# Patient Record
Sex: FEMALE | Race: ASIAN | NOT HISPANIC OR LATINO | Employment: FULL TIME | URBAN - METROPOLITAN AREA
[De-identification: names, ages, dates, MRNs, and addresses within clinical notes are randomized per-mention and may not be internally consistent; named-entity substitution may affect disease eponyms.]

---

## 2017-01-30 ENCOUNTER — OFFICE VISIT (OUTPATIENT)
Dept: PSYCHOLOGY | Facility: CLINIC | Age: 32
End: 2017-01-30
Payer: COMMERCIAL

## 2017-01-30 DIAGNOSIS — F43.29 ADJUSTMENT DISORDER WITH DISTURBANCE OF EMOTION: Primary | ICD-10-CM

## 2017-01-30 PROCEDURE — 90834 PSYTX W PT 45 MINUTES: CPT | Performed by: PSYCHOLOGIST

## 2017-01-30 NOTE — PROGRESS NOTES
Progress Note    Client Name: Berna Cheung  Date: 2017         Service Type: Consult Note      Session Start Time: 12:00  Session End Time: 12:45      Session Length: 45     Session #: 9     Attendees: Client attended alone    Treatment Plan Last Reviewed: 2017     DATA      Progress Since Last Session (Related to Symptoms / Goals / Homework):   Symptoms: Luisito took a hiatus from therapy because her mother .  She has been dealing with the  and estate.  She is experiencing intense grief and would like help working through it.    Homework: none      Episode of Care Goals: Adjust to mother's Alzheimer's disease.  Restart her life after being the sole care giver to her mother.  Resolve grief and loss issues.      Current / Ongoing Stressors and Concerns:   Mother has Alzheimer's,  Cannot be left alone, caring for her own child as well.     Treatment Objective(s) Addressed in This Session:   Develop and implement a plan for the care of her mother as well as restarting her own independent life.       Intervention:   Motivational interviewing, facilitate appropriate grieving, assist with planning.        ASSESSMENT: Current Emotional / Mental Status (status of significant symptoms):   Risk status (Self / Other harm or suicidal ideation)   Client denies current fears or concerns for personal safety.   Client denies current or recent suicidal ideation or behaviors.   Client denies current or recent homicidal ideation or behaviors.   Client denies current or recent self injurious behavior or ideation.   Client denies other safety concerns.   A safety and risk management plan has not been developed at this time, however client was given the after-hours number / 911 should there be a change in any of these risk factors.     Appearance:   Appropriate    Eye Contact:   Good    Psychomotor Behavior: Normal    Attitude:   Cooperative     Orientation:   All   Speech    Rate / Production: Normal     Volume:  Normal    Mood:    Depressed  Normal   Affect:    Appropriate    Thought Content:  Clear    Thought Form:  Coherent  Logical    Insight:    Good      Medication Review:   No changes to current psychiatric medication(s)     Medication Compliance:   Yes     Changes in Health Issues:   None reported     Chemical Use Review:   Substance Use: Chemical use reviewed, no active concerns identified      Tobacco Use: No change in amount of tobacco use since last session.  Patient declined discussion at this time     Collateral Reports Completed:   Not Applicable    PLAN: (Client Tasks / Therapist Tasks / Other)  Work on processing grief and loss.        Bert Pablo LP                                                         ________________________________________________________________________    Treatment Plan    Client's Name: Berna Cheung  YOB: 1985    Date: 6/16/2016    DSM-V Diagnoses: Adjustment DO with Anxiety and Depression  Psychosocial / Contextual Factors: Caring for mother with Alzheimer's  WHODAS: 24    Referral / Collaboration:  Referral to another professional/service is not indicated at this time..    Anticipated number of session or this episode of care: 20      MeasurableTreatment Goal(s) related to diagnosis / functional impairment(s)  Goal 1:  Reduce client's self report of symptoms from an 8 to a 4 on a 1 to 10 scale.       Objective #A (Client Action)    Work on coping strategies and making the adjustment to life without her mother    Intervention(s)  Motivational interviewing, facilitate appropriate grieving, assist with planning.      Client and Parent / Guardian has reviewed and agreed to the above plan.      Bert Pablo LP  June 14, 2016

## 2017-02-02 ASSESSMENT — ANXIETY QUESTIONNAIRES
5. BEING SO RESTLESS THAT IT IS HARD TO SIT STILL: MORE THAN HALF THE DAYS
3. WORRYING TOO MUCH ABOUT DIFFERENT THINGS: NEARLY EVERY DAY
7. FEELING AFRAID AS IF SOMETHING AWFUL MIGHT HAPPEN: NOT AT ALL
1. FEELING NERVOUS, ANXIOUS, OR ON EDGE: NEARLY EVERY DAY
GAD7 TOTAL SCORE: 17
6. BECOMING EASILY ANNOYED OR IRRITABLE: NEARLY EVERY DAY
2. NOT BEING ABLE TO STOP OR CONTROL WORRYING: NEARLY EVERY DAY

## 2017-02-02 ASSESSMENT — PATIENT HEALTH QUESTIONNAIRE - PHQ9: 5. POOR APPETITE OR OVEREATING: NEARLY EVERY DAY

## 2017-02-03 ASSESSMENT — ANXIETY QUESTIONNAIRES: GAD7 TOTAL SCORE: 17

## 2017-02-03 ASSESSMENT — PATIENT HEALTH QUESTIONNAIRE - PHQ9: SUM OF ALL RESPONSES TO PHQ QUESTIONS 1-9: 13

## 2017-02-27 ENCOUNTER — OFFICE VISIT (OUTPATIENT)
Dept: PSYCHOLOGY | Facility: CLINIC | Age: 32
End: 2017-02-27
Payer: MEDICAID

## 2017-02-27 DIAGNOSIS — F43.29: Primary | ICD-10-CM

## 2017-02-27 PROCEDURE — 90834 PSYTX W PT 45 MINUTES: CPT | Performed by: PSYCHOLOGIST

## 2017-02-27 ASSESSMENT — ANXIETY QUESTIONNAIRES
6. BECOMING EASILY ANNOYED OR IRRITABLE: SEVERAL DAYS
7. FEELING AFRAID AS IF SOMETHING AWFUL MIGHT HAPPEN: NOT AT ALL
2. NOT BEING ABLE TO STOP OR CONTROL WORRYING: SEVERAL DAYS
5. BEING SO RESTLESS THAT IT IS HARD TO SIT STILL: SEVERAL DAYS
3. WORRYING TOO MUCH ABOUT DIFFERENT THINGS: SEVERAL DAYS
1. FEELING NERVOUS, ANXIOUS, OR ON EDGE: SEVERAL DAYS
GAD7 TOTAL SCORE: 6

## 2017-02-27 ASSESSMENT — PATIENT HEALTH QUESTIONNAIRE - PHQ9: 5. POOR APPETITE OR OVEREATING: SEVERAL DAYS

## 2017-02-27 NOTE — MR AVS SNAPSHOT
"                  MRN:[id number]                      After Visit Summary   2017    Berna Cheung    MRN: [id number]           Visit Information        Provider Department      2017 1:00 PM Bert Pablo, FARHAD [facility]      Your next 10 appointments already scheduled     Mar 13, 2017 12:00 PM CDT   Return Visit with Bert Pablo LP   [facility] ([facility])    [address]  [facility] [address]   [phone]            Mar 27, 2017  1:00 PM CDT   Return Visit with Bert Pablo LP   [facility] ([facility])    [address]  [facility] [address] [phone]              MyChart Information     [employer] lets you send messages to your doctor, view your test results, renew your prescriptions, schedule appointments and more. To sign up, go to [email or web address] . Click on \"Log in\" on the left side of the screen, which will take you to the Welcome page. Then click on \"Sign up Now\" on the right side of the page.     You will be asked to enter the access code listed below, as well as some personal information. Please follow the directions to create your username and password.     Your access code is: [email or web address]  Expires: 2017  2:02 PM     Your access code will  in 90 days. If you need help or a new code, please call your [facility] clinic or [phone].        Care EveryWhere ID     This is your Care EveryWhere ID. This could be used by other organizations to access your [facility] medical records  [id number]        "

## 2017-02-27 NOTE — PROGRESS NOTES
Progress Note    Client Name: Berna Cheung  Date: 2/27/2017         Service Type: Consult Note      Session Start Time: 1:00  Session End Time: 1:45      Session Length: 45     Session #: 10     Attendees: Client attended alone    Treatment Plan Last Reviewed: 1/30/2017, 2/27/2017     DATA      Progress Since Last Session (Related to Symptoms / Goals / Homework):   Symptoms: Francisca looks good.  She is continuing to work through her grief but she is fully involved in planning her future.  There is a lot of work to do to sort out her mother's estate especially decisions about what to do with her harry lestate.  Homework: none      Episode of Care Goals: Adjust to mother's Alzheimer's disease.  Restart her life after being the sole care giver to her mother.  Resolve grief and loss issues.      Current / Ongoing Stressors and Concerns:   Mother has Alzheimer's,  Cannot be left alone, caring for her own child as well.     Treatment Objective(s) Addressed in This Session:   Develop and implement a plan for the care of her mother as well as restarting her own independent life.       Intervention:   Motivational interviewing, facilitate appropriate grieving, assist with planning.        ASSESSMENT: Current Emotional / Mental Status (status of significant symptoms):   Risk status (Self / Other harm or suicidal ideation)   Client denies current fears or concerns for personal safety.   Client denies current or recent suicidal ideation or behaviors.   Client denies current or recent homicidal ideation or behaviors.   Client denies current or recent self injurious behavior or ideation.   Client denies other safety concerns.   A safety and risk management plan has not been developed at this time, however client was given the after-hours number / 911 should there be a change in any of these risk factors.     Appearance:   Appropriate    Eye Contact:   Good    Psychomotor Behavior: Normal     Attitude:   Cooperative    Orientation:   All   Speech    Rate / Production: Normal     Volume:  Normal    Mood:    Depressed  Normal   Affect:    Appropriate    Thought Content:  Clear    Thought Form:  Coherent  Logical    Insight:    Good      Medication Review:   No changes to current psychiatric medication(s)     Medication Compliance:   Yes     Changes in Health Issues:   None reported     Chemical Use Review:   Substance Use: Chemical use reviewed, no active concerns identified      Tobacco Use: No change in amount of tobacco use since last session.  Patient declined discussion at this time     Collateral Reports Completed:   Not Applicable    PLAN: (Client Tasks / Therapist Tasks / Other)  Work on processing grief and loss.        Bert Pablo LP                                                         ________________________________________________________________________    Treatment Plan    Client's Name: Berna Cheung  YOB: 1985    Date: 6/16/2016    DSM-V Diagnoses: Adjustment DO with Anxiety and Depression  Psychosocial / Contextual Factors: Caring for mother with Alzheimer's  WHODAS: 24    Referral / Collaboration:  Referral to another professional/service is not indicated at this time..    Anticipated number of session or this episode of care: 20      MeasurableTreatment Goal(s) related to diagnosis / functional impairment(s)  Goal 1:  Reduce client's self report of symptoms from an 8 to a 4 on a 1 to 10 scale.       Objective #A (Client Action)    Work on coping strategies and making the adjustment to life without her mother    Intervention(s)  Motivational interviewing, facilitate appropriate grieving, assist with planning.      Client and Parent / Guardian has reviewed and agreed to the above plan.      Bert Pablo LP  June 14, 2016

## 2017-02-28 ASSESSMENT — PATIENT HEALTH QUESTIONNAIRE - PHQ9: SUM OF ALL RESPONSES TO PHQ QUESTIONS 1-9: 8

## 2017-02-28 ASSESSMENT — ANXIETY QUESTIONNAIRES: GAD7 TOTAL SCORE: 6

## 2017-04-10 ENCOUNTER — OFFICE VISIT (OUTPATIENT)
Dept: PSYCHOLOGY | Facility: CLINIC | Age: 32
End: 2017-04-10
Payer: MEDICAID

## 2017-04-10 DIAGNOSIS — F43.29 ADJUSTMENT REACTION WITH PREDOMINANT DISTURBANCE OF EMOTIONS: Primary | ICD-10-CM

## 2017-04-10 PROCEDURE — 90834 PSYTX W PT 45 MINUTES: CPT | Performed by: PSYCHOLOGIST

## 2017-04-10 ASSESSMENT — ANXIETY QUESTIONNAIRES
1. FEELING NERVOUS, ANXIOUS, OR ON EDGE: NEARLY EVERY DAY
7. FEELING AFRAID AS IF SOMETHING AWFUL MIGHT HAPPEN: SEVERAL DAYS
GAD7 TOTAL SCORE: 13
5. BEING SO RESTLESS THAT IT IS HARD TO SIT STILL: SEVERAL DAYS
2. NOT BEING ABLE TO STOP OR CONTROL WORRYING: SEVERAL DAYS
3. WORRYING TOO MUCH ABOUT DIFFERENT THINGS: NEARLY EVERY DAY
6. BECOMING EASILY ANNOYED OR IRRITABLE: NEARLY EVERY DAY

## 2017-04-10 ASSESSMENT — PATIENT HEALTH QUESTIONNAIRE - PHQ9: 5. POOR APPETITE OR OVEREATING: SEVERAL DAYS

## 2017-04-10 NOTE — MR AVS SNAPSHOT
"                  MRN:[id number]                      After Visit Summary   4/10/2017    Berna Cheung    MRN: [id number]           Visit Information        Provider Department      4/10/2017 12:00 PM Bert Pablo, FARHAD [facility]      Your next 10 appointments already scheduled     2017  1:00 PM CDT   Return Visit with Bert Pablo LP   [facility] [place] ([facility] [place])    [address]  [place] [address]   [phone]            2017  1:00 PM CDT   Return Visit with Bert Pablo LP   [facility] [place] ([facility] [place])    [address]  [place] [address]   [phone]            May 01, 2017  1:00 PM CDT   Return Visit with Bert Pablo LP   [facility] [place] ([facility] [place])    [address]  [place] [address]   [phone]              MyChart Information     [employer] lets you send messages to your doctor, view your test results, renew your prescriptions, schedule appointments and more. To sign up, go to [email or web address] . Click on \"Log in\" on the left side of the screen, which will take you to the Welcome page. Then click on \"Sign up Now\" on the right side of the page.     You will be asked to enter the access code listed below, as well as some personal information. Please follow the directions to create your username and password.     Your access code is: [email or web address]  Expires: 2017  3:02 PM     Your access code will  in 90 days. If you need help or a new code, please call your [facility] clinic or [phone].        Care EveryWhere ID     This is your Care EveryWhere ID. This could be used by other organizations to access your [facility] medical records  [id number]        "

## 2017-04-10 NOTE — PROGRESS NOTES
Progress Note    Client Name: Berna Cheung  Date: 4/10/2017         Service Type: Consult Note      Session Start Time: 12:00  Session End Time: 12:45      Session Length: 45     Session #: 11     Attendees: Client attended alone    Treatment Plan Last Reviewed: 1/30/2017, 2/27/2017, 4/10/2017     DATA      Progress Since Last Session (Related to Symptoms / Goals / Homework):   Symptoms: Francisca is having a difficult struggle with the guilt stage of grief and loss.  She is struggling at school because a few of her fellow students are very negative and gossipy. She feels isolated because she has pulled away from the party culture most of her friends belong to.      Episode of Care Goals: Adjust to mother's Alzheimer's disease.  Restart her life after being the sole care giver to her mother.  Resolve grief and loss issues.      Current / Ongoing Stressors and Concerns:   Mother has Alzheimer's,  Cannot be left alone, caring for her own child as well.     Treatment Objective(s) Addressed in This Session:   Develop and implement a plan for the care of her mother as well as restarting her own independent life.       Intervention:   Motivational interviewing, facilitate appropriate grieving, assist with planning.        ASSESSMENT: Current Emotional / Mental Status (status of significant symptoms):   Risk status (Self / Other harm or suicidal ideation)   Client denies current fears or concerns for personal safety.   Client denies current or recent suicidal ideation or behaviors.   Client denies current or recent homicidal ideation or behaviors.   Client denies current or recent self injurious behavior or ideation.   Client denies other safety concerns.   A safety and risk management plan has not been developed at this time, however client was given the after-hours number / 911 should there be a change in any of these risk factors.     Appearance:   Appropriate    Eye  Contact:   Good    Psychomotor Behavior: Normal    Attitude:   Cooperative    Orientation:   All   Speech    Rate / Production: Normal     Volume:  Normal    Mood:    Depressed  Normal   Affect:    Appropriate    Thought Content:  Clear    Thought Form:  Coherent  Logical    Insight:    Good      Medication Review:   No changes to current psychiatric medication(s)     Medication Compliance:   Yes     Changes in Health Issues:   None reported     Chemical Use Review:   Substance Use: Chemical use reviewed, no active concerns identified      Tobacco Use: No change in amount of tobacco use since last session.  Patient declined discussion at this time     Collateral Reports Completed:   Not Applicable    PLAN: (Client Tasks / Therapist Tasks / Other)  Work on processing grief and loss. Francisca would like to meet weekly for a while.        Bert Pablo LP                                                         ________________________________________________________________________    Treatment Plan    Client's Name: Berna Cheung  YOB: 1985    Date: 6/16/2016    DSM-V Diagnoses: Adjustment DO with Anxiety and Depression  Psychosocial / Contextual Factors: Caring for mother with Alzheimer's  WHODAS: 24    Referral / Collaboration:  Referral to another professional/service is not indicated at this time..    Anticipated number of session or this episode of care: 20      MeasurableTreatment Goal(s) related to diagnosis / functional impairment(s)  Goal 1:  Reduce client's self report of symptoms from an 8 to a 4 on a 1 to 10 scale.       Objective #A (Client Action)    Work on coping strategies and making the adjustment to life without her mother    Intervention(s)  Motivational interviewing, facilitate appropriate grieving, assist with planning.      Client and Parent / Guardian has reviewed and agreed to the above plan.      Bert Pablo, FARHAD  June 14, 2016

## 2017-04-11 ASSESSMENT — ANXIETY QUESTIONNAIRES: GAD7 TOTAL SCORE: 13

## 2017-04-11 ASSESSMENT — PATIENT HEALTH QUESTIONNAIRE - PHQ9: SUM OF ALL RESPONSES TO PHQ QUESTIONS 1-9: 15

## 2017-04-17 ENCOUNTER — OFFICE VISIT (OUTPATIENT)
Dept: URGENT CARE | Facility: URGENT CARE | Age: 32
End: 2017-04-17
Payer: MEDICAID

## 2017-04-17 ENCOUNTER — OFFICE VISIT (OUTPATIENT)
Dept: PSYCHOLOGY | Facility: CLINIC | Age: 32
End: 2017-04-17
Payer: MEDICAID

## 2017-04-17 VITALS
HEART RATE: 89 BPM | SYSTOLIC BLOOD PRESSURE: 132 MMHG | WEIGHT: 198 LBS | TEMPERATURE: 99.1 F | OXYGEN SATURATION: 96 % | DIASTOLIC BLOOD PRESSURE: 78 MMHG | BODY MASS INDEX: 33.99 KG/M2

## 2017-04-17 DIAGNOSIS — R09.89 CHEST CONGESTION: ICD-10-CM

## 2017-04-17 DIAGNOSIS — R05.8 PRODUCTIVE COUGH: Primary | ICD-10-CM

## 2017-04-17 DIAGNOSIS — F43.29: Primary | ICD-10-CM

## 2017-04-17 PROCEDURE — 90834 PSYTX W PT 45 MINUTES: CPT | Performed by: PSYCHOLOGIST

## 2017-04-17 PROCEDURE — 99214 OFFICE O/P EST MOD 30 MIN: CPT | Performed by: PHYSICIAN ASSISTANT

## 2017-04-17 RX ORDER — AZITHROMYCIN 250 MG/1
TABLET, FILM COATED ORAL
Qty: 6 TABLET | Refills: 0 | Status: SHIPPED | OUTPATIENT
Start: 2017-04-17 | End: 2017-09-11

## 2017-04-17 RX ORDER — LORATADINE 10 MG/1
10 TABLET ORAL DAILY
COMMUNITY
End: 2018-01-31

## 2017-04-17 NOTE — PROGRESS NOTES
Progress Note    Client Name: Berna Cheung  Date: 4/17/2017         Service Type: Consult Note      Session Start Time: 1:00  Session End Time: 1:45      Session Length: 45     Session #: 12     Attendees: Client attended alone    Treatment Plan Last Reviewed: 1/30/2017, 2/27/2017, 4/10/2017, 4/17/2017     DATA      Progress Since Last Session (Related to Symptoms / Goals / Homework):   Symptoms: Francisca is doing fine.  She is hanging in there at school and has a good attitude about it.  She working working with some different girls who are young but not as mean.   She has been sick with a cold for the past week.  She is going to go to the clinic to get checked.      Episode of Care Goals: Adjust to mother's Alzheimer's disease.  Restart her life after being the sole care giver to her mother.  Resolve grief and loss issues.      Current / Ongoing Stressors and Concerns:   Mother has Alzheimer's,  Cannot be left alone, caring for her own child as well.     Treatment Objective(s) Addressed in This Session:   Develop and implement a plan for the care of her mother as well as restarting her own independent life.       Intervention:   Motivational interviewing, facilitate appropriate grieving, assist with planning.        ASSESSMENT: Current Emotional / Mental Status (status of significant symptoms):   Risk status (Self / Other harm or suicidal ideation)   Client denies current fears or concerns for personal safety.   Client denies current or recent suicidal ideation or behaviors.   Client denies current or recent homicidal ideation or behaviors.   Client denies current or recent self injurious behavior or ideation.   Client denies other safety concerns.   A safety and risk management plan has not been developed at this time, however client was given the after-hours number / 911 should there be a change in any of these risk factors.     Appearance:   Appropriate    Eye  Contact:   Good    Psychomotor Behavior: Normal    Attitude:   Cooperative    Orientation:   All   Speech    Rate / Production: Normal     Volume:  Normal    Mood:    Depressed  Normal   Affect:    Appropriate    Thought Content:  Clear    Thought Form:  Coherent  Logical    Insight:    Good      Medication Review:   No changes to current psychiatric medication(s)     Medication Compliance:   Yes     Changes in Health Issues:   None reported     Chemical Use Review:   Substance Use: Chemical use reviewed, no active concerns identified      Tobacco Use: No change in amount of tobacco use since last session.  Patient declined discussion at this time     Collateral Reports Completed:   Not Applicable    PLAN: (Client Tasks / Therapist Tasks / Other)  Work on processing grief and loss. Francisca would like to meet weekly for a while.        Bert Pablo LP                                                         ________________________________________________________________________    Treatment Plan    Client's Name: Berna Cheung  YOB: 1985    Date: 6/16/2016    DSM-V Diagnoses: Adjustment DO with Anxiety and Depression  Psychosocial / Contextual Factors: Caring for mother with Alzheimer's  WHODAS: 24    Referral / Collaboration:  Referral to another professional/service is not indicated at this time..    Anticipated number of session or this episode of care: 20      MeasurableTreatment Goal(s) related to diagnosis / functional impairment(s)  Goal 1:  Reduce client's self report of symptoms from an 8 to a 4 on a 1 to 10 scale.       Objective #A (Client Action)    Work on coping strategies and making the adjustment to life without her mother    Intervention(s)  Motivational interviewing, facilitate appropriate grieving, assist with planning.      Client and Parent / Guardian has reviewed and agreed to the above plan.      Bert Pablo, FARHAD  June 14, 2016

## 2017-04-17 NOTE — NURSING NOTE
"Chief Complaint   Patient presents with     Fever     Cough     Nasal Congestion     x 1 week        Initial /78 (BP Location: Left arm, Patient Position: Chair, Cuff Size: Adult Regular)  Pulse 89  Temp 99.1  F (37.3  C) (Oral)  Wt 198 lb (89.8 kg)  SpO2 96%  BMI 33.99 kg/m2 Estimated body mass index is 33.99 kg/(m^2) as calculated from the following:    Height as of 8/29/16: 5' 4\" (1.626 m).    Weight as of this encounter: 198 lb (89.8 kg).  Medication Reconciliation: complete  "

## 2017-04-17 NOTE — MR AVS SNAPSHOT
After Visit Summary   4/17/2017    Berna Cheung    MRN: [id number]           Patient Information     Date Of Birth          1985        Visit Information        Provider Department      4/17/2017 2:45 PM Rafael Radford PA-C [facility]        Today's Diagnoses     Productive cough    -  1    Chest congestion           Follow-ups after your visit        Your next 10 appointments already scheduled     Apr 24, 2017  1:00 PM CDT   Return Visit with Bert Pablo LP   [facility] [place] ([facility] [place])    [address] [place] [address] [address]  [place] [address]   [phone]            May 01, 2017  1:00 PM CDT   Return Visit with Bert Pablo LP   [facility] [place] ([facility] [place])    [address] [place] [address] [address]  [place] [address]   [phone]            May 08, 2017  1:00 PM CDT   Return Visit with Bert Pablo LP   [facility] [place] ([facility] [place])    [address] [place] [address] [address]  [place] [address]   [phone]              Who to contact     If you have questions or need follow up information about today's clinic visit or your schedule please contact [facility] directly at [phone].  Normal or non-critical lab and imaging results will be communicated to you by [employer], letter or phone within 4 business days after the clinic has received the results. If you do not hear from us within 7 days, please contact the clinic through MyChart or phone. If you have a critical or abnormal lab result, we will notify you by phone as soon as possible.  Submit refill requests through [employer] or call your pharmacy and they will forward the refill request to us. Please allow 3 business days for your refill to be completed.          Additional Information About Your Visit        [employer] Information     [employer] lets you send messages to your doctor, view your test results, renew your prescriptions, schedule  "appointments and more. To sign up, go to [email or web address] . Click on \"Log in\" on the left side of the screen, which will take you to the Welcome page. Then click on \"Sign up Now\" on the right side of the page.     You will be asked to enter the access code listed below, as well as some personal information. Please follow the directions to create your username and password.     Your access code is: [email or web address]  Expires: 2017  3:02 PM     Your access code will  in 90 days. If you need help or a new code, please call your [place] clinic or [phone].        Care EveryWhere ID     This is your Care EveryWhere ID. This could be used by other organizations to access your [place] medical records  [id number]        Your Vitals Were     Pulse Temperature Pulse Oximetry BMI (Body Mass Index)          89 99.1  F (37.3  C) (Oral) 96% 33.99 kg/m2         Blood Pressure from Last 3 Encounters:   17 132/78   16 112/70   16 114/70    Weight from Last 3 Encounters:   17 198 lb (89.8 kg)   16 196 lb 3.2 oz (89 kg)   16 205 lb 3.2 oz (93.1 kg)              Today, you had the following     No orders found for display         Today's Medication Changes          These changes are accurate as of: 17 11:59 PM.  If you have any questions, ask your nurse or doctor.               Start taking these medicines.        Dose/Directions    azithromycin 250 MG tablet   Commonly known as:  ZITHROMAX   Used for:  Productive cough, Chest congestion   Started by:  Rafael Radford PA-C        2 tabs po qd day 1, then 1 tab po qd days 2-5   Quantity:  6 tablet   Refills:  0            Where to get your medicines      Some of these will need a paper prescription and others can be bought over the counter.  Ask your nurse if you have questions.     Bring a paper prescription for each of these medications     azithromycin 250 MG tablet                Primary Care Provider Office Phone # Fax " #    Shakila Annalisa Sandoval [phone]       FATIMAH DONTRELL [facility] [address] [facility] [address]  [facility] [address]        Thank you!     Thank you for choosing [facility]  for your care. Our goal is always to provide you with excellent care. Hearing back from our patients is one way we can continue to improve our services. Please take a few minutes to complete the written survey that you may receive in the mail after your visit with us. Thank you!             Your Updated Medication List - Protect others around you: Learn how to safely use, store and throw away your medicines at www.disposemymeds.org.          This list is accurate as of: 4/17/17 11:59 PM.  Always use your most recent med list.                   Brand Name Dispense Instructions for use    albuterol 108 (90 BASE) MCG/ACT Inhaler    PROAIR HFA/PROVENTIL HFA/VENTOLIN HFA    1 Inhaler    Inhale 2 puffs into the lungs every 6 hours as needed for shortness of breath / dyspnea or wheezing       azithromycin 250 MG tablet    ZITHROMAX    6 tablet    2 tabs po qd day 1, then 1 tab po qd days 2-5       desonide 0.05 % ointment    DESOWEN    15 g    Apply sparingly once or twice per day as needed to affected area until the skin is better, then stop; repeat as needed       ibuprofen 400-800 mg tablet    ADVIL,MOTRIN    90 tablet    Take 1-2 tablets (400-800 mg) by mouth every 6 hours as needed for other (cramping)       loratadine 10 MG tablet    CLARITIN     Take 10 mg by mouth daily       methocarbamol 500 MG tablet    ROBAXIN    30 tablet    Take 2 tablets (1,000 mg) by mouth 3 times daily as needed for muscle spasms       PRENATAL VITAMINS PO      Reported on 4/17/2017       senna-docusate 8.6-50 MG per tablet    SENOKOT-S;PERICOLACE    60 tablet    Take 1-2 tablets by mouth 2 times daily       sertraline 50 MG tablet    ZOLOFT    45 tablet    1/2 tablet (25 mg ) once per day for one week, then 1 tablet per day  for 2 weeks, then 2 tablet per day       SUDAFED PO          triamcinolone 0.1 % ointment    KENALOG    80 g    Apply sparingly once or twice per day as needed to affected area until the skin is better, then stop (do not apply to the face)

## 2017-04-17 NOTE — MR AVS SNAPSHOT
"                  MRN:[id number]                      After Visit Summary   2017    Berna Cheung    MRN: [id number]           Visit Information        Provider Department      2017 1:00 PM Bert Pablo, FARHAD [facility] Generic      Your next 10 appointments already scheduled     2017  1:00 PM CDT   Return Visit with Bert Pablo LP   [facility] [place] ([facility] [place])    [address]  [place] [address]   [phone]            May 01, 2017  1:00 PM CDT   Return Visit with Bert Pablo LP   [facility] [place] ([facility] [place])    [address]  [place] [address]   [phone]            May 08, 2017  1:00 PM CDT   Return Visit with Bert Pablo LP   [facility] [place] ([facility] [place])    [address]  [place] [address]   [phone]              MyChart Information     [employer] lets you send messages to your doctor, view your test results, renew your prescriptions, schedule appointments and more. To sign up, go to [email or web address] . Click on \"Log in\" on the left side of the screen, which will take you to the Welcome page. Then click on \"Sign up Now\" on the right side of the page.     You will be asked to enter the access code listed below, as well as some personal information. Please follow the directions to create your username and password.     Your access code is: [email or web address]  Expires: 2017  3:02 PM     Your access code will  in 90 days. If you need help or a new code, please call your [place] clinic or [phone].        Care EveryWhere ID     This is your Care EveryWhere ID. This could be used by other organizations to access your [place] medical records  [id number]        "

## 2017-04-18 NOTE — PROGRESS NOTES
SUBJECTIVE:   Berna Cheung is a 32 year old female presenting with a chief complaint of coughing, chest congestion, productive cough.  Onset of symptoms was 8 day(s) ago.  Course of illness is worsening.    Severity moderate  Current and Associated symptoms: nasal congestion, rhinorrhea, cough  and chest congestion  Treatment measures tried include OTC meds.  Predisposing factors include recent illness.    Past Medical History:   Diagnosis Date     Generalized anxiety disorder 2006     History of gestational diabetes     gestational diabestes diet control       ALLERGIES   No Known Allergies      Social History   Substance Use Topics     Smoking status: Current Every Day Smoker     Smokeless tobacco: Never Used      Comment: 1/4 ppd     Alcohol use No       ROS:  CONSTITUTIONAL:NEGATIVE for fever, chills, change in weight  INTEGUMENTARY/SKIN: NEGATIVE for worrisome rashes, moles or lesions  ENT/MOUTH: POSITIVE for purulent nasal drainage  RESP:POSITIVE for cough-productive  CV: NEGATIVE for chest pain, palpitations or peripheral edema  GI: NEGATIVE for nausea, abdominal pain, heartburn, or change in bowel habits  MUSCULOSKELETAL: NEGATIVE for significant arthralgias or myalgia  NEURO: NEGATIVE for weakness, dizziness or paresthesias    OBJECTIVE  :/78 (BP Location: Left arm, Patient Position: Chair, Cuff Size: Adult Regular)  Pulse 89  Temp 99.1  F (37.3  C) (Oral)  Wt 198 lb (89.8 kg)  SpO2 96%  BMI 33.99 kg/m2  GENERAL APPEARANCE: healthy, alert and no distress  EYES: EOMI,  PERRL, conjunctiva clear  HENT: ear canals and TM's normal.  Nose and mouth without ulcers, erythema or lesions  NECK: supple, nontender, no lymphadenopathy  RESP: lungs clear to auscultation - no rales, rhonchi or wheezes  CV: regular rates and rhythm, normal S1 S2, no murmur noted  NEURO: Normal strength and tone, sensory exam grossly normal,  normal speech and mentation  SKIN: no suspicious lesions or rashes    ASSESSMENT/PLAN:     ICD-10-CM    1. Productive cough R05 azithromycin (ZITHROMAX) 250 MG tablet   2. Chest congestion R09.89 azithromycin (ZITHROMAX) 250 MG tablet       Follow up as needed

## 2017-04-24 ENCOUNTER — OFFICE VISIT (OUTPATIENT)
Dept: PSYCHOLOGY | Facility: CLINIC | Age: 32
End: 2017-04-24
Payer: MEDICAID

## 2017-04-24 DIAGNOSIS — F43.29 ADJUSTMENT REACTION WITH PREDOMINANT DISTURBANCE OF EMOTIONS: Primary | ICD-10-CM

## 2017-04-24 PROCEDURE — 90834 PSYTX W PT 45 MINUTES: CPT | Performed by: PSYCHOLOGIST

## 2017-04-24 ASSESSMENT — PATIENT HEALTH QUESTIONNAIRE - PHQ9: 5. POOR APPETITE OR OVEREATING: SEVERAL DAYS

## 2017-04-24 ASSESSMENT — ANXIETY QUESTIONNAIRES
6. BECOMING EASILY ANNOYED OR IRRITABLE: NEARLY EVERY DAY
5. BEING SO RESTLESS THAT IT IS HARD TO SIT STILL: SEVERAL DAYS
1. FEELING NERVOUS, ANXIOUS, OR ON EDGE: SEVERAL DAYS
7. FEELING AFRAID AS IF SOMETHING AWFUL MIGHT HAPPEN: SEVERAL DAYS
3. WORRYING TOO MUCH ABOUT DIFFERENT THINGS: MORE THAN HALF THE DAYS
GAD7 TOTAL SCORE: 11
2. NOT BEING ABLE TO STOP OR CONTROL WORRYING: MORE THAN HALF THE DAYS

## 2017-04-24 NOTE — MR AVS SNAPSHOT
"                  MRN:[id number]                      After Visit Summary   2017    Berna Cheung    MRN: [id number]           Visit Information        Provider Department      2017 1:00 PM Bert Pablo, FARHAD [facility] Generic      Your next 10 appointments already scheduled     May 01, 2017  1:00 PM CDT   Return Visit with Bert Pablo LP   [facility] [place] ([facility] [place])    [address]  [place] [address]   [phone]            May 08, 2017  1:00 PM CDT   Return Visit with Bert Pablo LP   [facility] [place] ([facility] [place])    [address]  [place] [address]   [phone]            May 15, 2017  1:00 PM CDT   Return Visit with Bert Pablo LP   [facility] [place] ([facility] [place])    [address]  [place] [address]   [phone]              MyChart Information     [facility] lets you send messages to your doctor, view your test results, renew your prescriptions, schedule appointments and more. To sign up, go to [email or web address] . Click on \"Log in\" on the left side of the screen, which will take you to the Welcome page. Then click on \"Sign up Now\" on the right side of the page.     You will be asked to enter the access code listed below, as well as some personal information. Please follow the directions to create your username and password.     Your access code is: [email or web address]  Expires: 2017  3:02 PM     Your access code will  in 90 days. If you need help or a new code, please call your [place] clinic or [phone].        Care EveryWhere ID     This is your Care EveryWhere ID. This could be used by other organizations to access your [place] medical records  [id number]        "

## 2017-04-24 NOTE — PROGRESS NOTES
Progress Note    Client Name: Berna Cheung  Date: 4/24/2017           Service Type: Consult Note      Session Start Time: 1:00  Session End Time: 1:45      Session Length: 45     Session #: 13     Attendees: Client attended alone    Treatment Plan Last Reviewed: 1/30/2017, 2/27/2017, 4/10/2017, 4/17/2017, 4/24/2017     DATA      Progress Since Last Session (Related to Symptoms / Goals / Homework):   Symptoms: Francisca is feeling better physically and emotionally.  She talked about her grief which is getting better.  She still feels guilty now and then.  She also talked about her struggles with school.  She is an older student and is trying to figure out how to get along with the other students who are much younger.  In terms of her long term relationships she is learning that doing things for others more often leads to resentment rather than closer relationships.  She recognizes she needs to find activities as a basis of friendship rather than helping.      Episode of Care Goals: Adjust to mother's Alzheimer's disease.  Restart her life after being the sole care giver to her mother.  Resolve grief and loss issues.      Current / Ongoing Stressors and Concerns:   Mother has Alzheimer's,  Cannot be left alone, caring for her own child as well.     Treatment Objective(s) Addressed in This Session:   Develop and implement a plan for the care of her mother as well as restarting her own independent life.       Intervention:   Motivational interviewing, facilitate appropriate grieving, assist with planning.        ASSESSMENT: Current Emotional / Mental Status (status of significant symptoms):   Risk status (Self / Other harm or suicidal ideation)   Client denies current fears or concerns for personal safety.   Client denies current or recent suicidal ideation or behaviors.   Client denies current or recent homicidal ideation or behaviors.   Client denies current or recent self  injurious behavior or ideation.   Client denies other safety concerns.   A safety and risk management plan has not been developed at this time, however client was given the after-hours number / 911 should there be a change in any of these risk factors.     Appearance:   Appropriate    Eye Contact:   Good    Psychomotor Behavior: Normal    Attitude:   Cooperative    Orientation:   All   Speech    Rate / Production: Normal     Volume:  Normal    Mood:    Depressed  Normal   Affect:    Appropriate    Thought Content:  Clear    Thought Form:  Coherent  Logical    Insight:    Good      Medication Review:   No changes to current psychiatric medication(s)     Medication Compliance:   Yes     Changes in Health Issues:   None reported     Chemical Use Review:   Substance Use: Chemical use reviewed, no active concerns identified      Tobacco Use: No change in amount of tobacco use since last session.  Patient declined discussion at this time     Collateral Reports Completed:   Not Applicable    PLAN: (Client Tasks / Therapist Tasks / Other)  Work on processing grief and loss. Francisca would like to meet weekly for a while.        Bert Pablo LP                                                         ________________________________________________________________________    Treatment Plan    Client's Name: Berna Cheung  YOB: 1985    Date: 6/16/2016    DSM-V Diagnoses: Adjustment DO with Anxiety and Depression  Psychosocial / Contextual Factors: Caring for mother with Alzheimer's  WHODAS: 24    Referral / Collaboration:  Referral to another professional/service is not indicated at this time..    Anticipated number of session or this episode of care: 20      MeasurableTreatment Goal(s) related to diagnosis / functional impairment(s)  Goal 1:  Reduce client's self report of symptoms from an 8 to a 4 on a 1 to 10 scale.       Objective #A (Client Action)    Work on coping strategies and making the adjustment  to life without her mother    Intervention(s)  Motivational interviewing, facilitate appropriate grieving, assist with planning.      Client and Parent / Guardian has reviewed and agreed to the above plan.      Bert Pablo, FARHAD  June 14, 2016

## 2017-04-25 ASSESSMENT — PATIENT HEALTH QUESTIONNAIRE - PHQ9: SUM OF ALL RESPONSES TO PHQ QUESTIONS 1-9: 11

## 2017-04-25 ASSESSMENT — ANXIETY QUESTIONNAIRES: GAD7 TOTAL SCORE: 11

## 2017-05-01 ENCOUNTER — OFFICE VISIT (OUTPATIENT)
Dept: PSYCHOLOGY | Facility: CLINIC | Age: 32
End: 2017-05-01
Payer: MEDICAID

## 2017-05-01 DIAGNOSIS — F43.29 ADJUSTMENT REACTION WITH PREDOMINANT DISTURBANCE OF EMOTIONS: Primary | ICD-10-CM

## 2017-05-01 PROCEDURE — 90834 PSYTX W PT 45 MINUTES: CPT | Performed by: PSYCHOLOGIST

## 2017-05-01 ASSESSMENT — ANXIETY QUESTIONNAIRES
5. BEING SO RESTLESS THAT IT IS HARD TO SIT STILL: SEVERAL DAYS
1. FEELING NERVOUS, ANXIOUS, OR ON EDGE: SEVERAL DAYS
7. FEELING AFRAID AS IF SOMETHING AWFUL MIGHT HAPPEN: SEVERAL DAYS
3. WORRYING TOO MUCH ABOUT DIFFERENT THINGS: SEVERAL DAYS
2. NOT BEING ABLE TO STOP OR CONTROL WORRYING: SEVERAL DAYS
GAD7 TOTAL SCORE: 8
6. BECOMING EASILY ANNOYED OR IRRITABLE: MORE THAN HALF THE DAYS

## 2017-05-01 ASSESSMENT — PATIENT HEALTH QUESTIONNAIRE - PHQ9: 5. POOR APPETITE OR OVEREATING: SEVERAL DAYS

## 2017-05-01 NOTE — PROGRESS NOTES
Progress Note    Client Name: Berna Cheung  Date: 5/1/2017         Service Type: Consult Note      Session Start Time: 1:00  Session End Time: 1:45      Session Length: 45     Session #: 14     Attendees: Client attended alone    Treatment Plan Last Reviewed: 1/30/2017, 2/27/2017, 4/10/2017, 4/17/2017, 4/24/2017, 5/1/2017     DATA      Progress Since Last Session (Related to Symptoms / Goals / Homework):   Symptoms: Francicsa reports progress on: 1) setting appropriate limits, and 2) letting go of past resentments and little irritations.  She is working on enjoying school more.  She is starting to think about getting out more and isolating less.     Episode of Care Goals: Adjust to mother's Alzheimer's disease.  Restart her life after being the sole care giver to her mother.  Resolve grief and loss issues.      Current / Ongoing Stressors and Concerns:   Mother has Alzheimer's,  Cannot be left alone, caring for her own child as well.     Treatment Objective(s) Addressed in This Session:   Develop and implement a plan for the care of her mother as well as restarting her own independent life.       Intervention:   Motivational interviewing, facilitate appropriate grieving, assist with planning.        ASSESSMENT: Current Emotional / Mental Status (status of significant symptoms):   Risk status (Self / Other harm or suicidal ideation)   Client denies current fears or concerns for personal safety.   Client denies current or recent suicidal ideation or behaviors.   Client denies current or recent homicidal ideation or behaviors.   Client denies current or recent self injurious behavior or ideation.   Client denies other safety concerns.   A safety and risk management plan has not been developed at this time, however client was given the after-hours number / 911 should there be a change in any of these risk factors.     Appearance:   Appropriate    Eye Contact:   Good     Psychomotor Behavior: Normal    Attitude:   Cooperative    Orientation:   All   Speech    Rate / Production: Normal     Volume:  Normal    Mood:    Depressed  Normal   Affect:    Appropriate    Thought Content:  Clear    Thought Form:  Coherent  Logical    Insight:    Good      Medication Review:   No changes to current psychiatric medication(s)     Medication Compliance:   Yes     Changes in Health Issues:   None reported     Chemical Use Review:   Substance Use: Chemical use reviewed, no active concerns identified      Tobacco Use: No change in amount of tobacco use since last session.  Patient declined discussion at this time     Collateral Reports Completed:   Not Applicable    PLAN: (Client Tasks / Therapist Tasks / Other)  Work on processing grief and loss. Francisca would like to meet weekly for a while.        Bert Pablo LP                                                         ________________________________________________________________________    Treatment Plan    Client's Name: Berna Cheung  YOB: 1985    Date: 6/16/2016    DSM-V Diagnoses: Adjustment DO with Anxiety and Depression  Psychosocial / Contextual Factors: Caring for mother with Alzheimer's  WHODAS: 24    Referral / Collaboration:  Referral to another professional/service is not indicated at this time..    Anticipated number of session or this episode of care: 20      MeasurableTreatment Goal(s) related to diagnosis / functional impairment(s)  Goal 1:  Reduce client's self report of symptoms from an 8 to a 4 on a 1 to 10 scale.       Objective #A (Client Action)    Work on coping strategies and making the adjustment to life without her mother    Intervention(s)  Motivational interviewing, facilitate appropriate grieving, assist with planning.      Client and Parent / Guardian has reviewed and agreed to the above plan.      Bert Pablo LP  June 14, 2016

## 2017-05-01 NOTE — MR AVS SNAPSHOT
"                  MRN:[id number]                      After Visit Summary   2017    Berna Cheung    MRN: [id number]           Visit Information        Provider Department      2017 1:00 PM Bert Pablo, FARHAD [facility] Generic      Your next 10 appointments already scheduled     May 08, 2017  1:00 PM CDT   Return Visit with Bert Pablo LP   [facility] [place] ([facility] [place])    [address]  [place] [address]   [phone]            May 15, 2017  1:00 PM CDT   Return Visit with Bert Pablo LP   [facility] [place] ([facility] [place])    [address]  [place] [address]   [phone]            May 22, 2017  1:00 PM CDT   Return Visit with Bert Pablo LP   [facility] [place] ([facility] [place])    [address]  [place] [address]   [phone]              MyChart Information     [employer] lets you send messages to your doctor, view your test results, renew your prescriptions, schedule appointments and more. To sign up, go to [email or web address] . Click on \"Log in\" on the left side of the screen, which will take you to the Welcome page. Then click on \"Sign up Now\" on the right side of the page.     You will be asked to enter the access code listed below, as well as some personal information. Please follow the directions to create your username and password.     Your access code is: [email or web address]  Expires: 2017  3:02 PM     Your access code will  in 90 days. If you need help or a new code, please call your [place] clinic or [phone].        Care EveryWhere ID     This is your Care EveryWhere ID. This could be used by other organizations to access your [place] medical records  [id number]        "

## 2017-05-02 ASSESSMENT — PATIENT HEALTH QUESTIONNAIRE - PHQ9: SUM OF ALL RESPONSES TO PHQ QUESTIONS 1-9: 9

## 2017-05-02 ASSESSMENT — ANXIETY QUESTIONNAIRES: GAD7 TOTAL SCORE: 8

## 2017-05-08 ENCOUNTER — OFFICE VISIT (OUTPATIENT)
Dept: PSYCHOLOGY | Facility: CLINIC | Age: 32
End: 2017-05-08
Payer: MEDICAID

## 2017-05-08 DIAGNOSIS — F43.29 ADJUSTMENT REACTION WITH PREDOMINANT DISTURBANCE OF EMOTIONS: Primary | ICD-10-CM

## 2017-05-08 PROCEDURE — 90834 PSYTX W PT 45 MINUTES: CPT | Performed by: PSYCHOLOGIST

## 2017-05-08 ASSESSMENT — ANXIETY QUESTIONNAIRES
2. NOT BEING ABLE TO STOP OR CONTROL WORRYING: SEVERAL DAYS
7. FEELING AFRAID AS IF SOMETHING AWFUL MIGHT HAPPEN: SEVERAL DAYS
3. WORRYING TOO MUCH ABOUT DIFFERENT THINGS: NEARLY EVERY DAY
GAD7 TOTAL SCORE: 11
6. BECOMING EASILY ANNOYED OR IRRITABLE: NEARLY EVERY DAY
5. BEING SO RESTLESS THAT IT IS HARD TO SIT STILL: SEVERAL DAYS
1. FEELING NERVOUS, ANXIOUS, OR ON EDGE: SEVERAL DAYS

## 2017-05-08 ASSESSMENT — PATIENT HEALTH QUESTIONNAIRE - PHQ9: 5. POOR APPETITE OR OVEREATING: SEVERAL DAYS

## 2017-05-08 NOTE — PROGRESS NOTES
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  We talked about strategies for dealing with difficult people.     Episode of Care Goals: Adjust to mother's Alzheimer's disease.  Restart her life after being the sole care giver to her mother.  Resolve grief and loss issues.      Current / Ongoing Stressors and Concerns:   Mother has Alzheimer's,  Cannot be left alone, caring for her own child as well.     Treatment Objective(s) Addressed in This Session:   Develop and implement a plan for the care of her mother as well as restarting her own independent life.       Intervention:   Motivational interviewing, facilitate appropriate grieving, assist with planning.        ASSESSMENT: Current Emotional / Mental Status (status of significant symptoms):   Risk status (Self / Other harm or suicidal ideation)   Client denies current fears or concerns for personal safety.   Client denies current or recent suicidal ideation or behaviors.   Client denies current or recent homicidal ideation or behaviors.   Client denies current or recent self injurious behavior or ideation.   Client denies other safety concerns.   A safety and risk management plan has not been developed at this time, however client was given the after-hours number / 911 should there be a change in any of these risk factors.     Appearance:   Appropriate    Eye Contact:   Good    Psychomotor Behavior: Normal    Attitude:   Cooperative     Orientation:   All   Speech    Rate / Production: Normal     Volume:  Normal    Mood:    Depressed  Normal   Affect:    Appropriate    Thought Content:  Clear    Thought Form:  Coherent  Logical    Insight:    Good      Medication Review:   No changes to current psychiatric medication(s)     Medication Compliance:   Yes     Changes in Health Issues:   None reported     Chemical Use Review:   Substance Use: Chemical use reviewed, no active concerns identified      Tobacco Use: No change in amount of tobacco use since last session.  Patient declined discussion at this time     Collateral Reports Completed:   Not Applicable    PLAN: (Client Tasks / Therapist Tasks / Other)  Francisca seems to have come a long way in her recovery from her mother's death but she will benefit from ongoing support and help adjusting to her new circumstances.        Bert Pablo LP                                                         ________________________________________________________________________    Treatment Plan    Client's Name: Berna Cheung  YOB: 1985    Date: 6/16/2016    DSM-V Diagnoses: Adjustment DO with Anxiety and Depression  Psychosocial / Contextual Factors: Caring for mother with Alzheimer's  WHODAS: 24    Referral / Collaboration:  Referral to another professional/service is not indicated at this time..    Anticipated number of session or this episode of care: 20      MeasurableTreatment Goal(s) related to diagnosis / functional impairment(s)  Goal 1:  Reduce client's self report of symptoms from an 8 to a 4 on a 1 to 10 scale.       Objective #A (Client Action)    Work on coping strategies and making the adjustment to life without her mother    Intervention(s)  Motivational interviewing, facilitate appropriate grieving, assist with planning.      Client and Parent / Guardian has reviewed and agreed to the above plan.      Bert Pablo, FARHAD  June 14, 2016

## 2017-05-08 NOTE — MR AVS SNAPSHOT
"                  MRN:[id number]                      After Visit Summary   2017    Berna Chueng    MRN: [id number]           Visit Information        Provider Department      2017 1:00 PM Bert Pablo, FARHAD [facility] Generic      Your next 10 appointments already scheduled     May 15, 2017  1:00 PM CDT   Return Visit with Bert Pablo LP   [facility] ([facility] [place])    [address]  [facility] [address]   [phone]            May 22, 2017  1:00 PM CDT   Return Visit with Bert Pablo LP   [facility] ([facility] [place])    [address]  [facility] [address] [phone]              MyChart Information     Profext lets you send messages to your doctor, view your test results, renew your prescriptions, schedule appointments and more. To sign up, go to [email or web address] . Click on \"Log in\" on the left side of the screen, which will take you to the Welcome page. Then click on \"Sign up Now\" on the right side of the page.     You will be asked to enter the access code listed below, as well as some personal information. Please follow the directions to create your username and password.     Your access code is: [email or web address]  Expires: 2017  3:02 PM     Your access code will  in 90 days. If you need help or a new code, please call your [facility] clinic or [phone].        Care EveryWhere ID     This is your Care EveryWhere ID. This could be used by other organizations to access your [facility] medical records  [id number]        "

## 2017-05-09 ASSESSMENT — ANXIETY QUESTIONNAIRES: GAD7 TOTAL SCORE: 11

## 2017-05-09 ASSESSMENT — PATIENT HEALTH QUESTIONNAIRE - PHQ9: SUM OF ALL RESPONSES TO PHQ QUESTIONS 1-9: 9

## 2017-05-15 ENCOUNTER — OFFICE VISIT (OUTPATIENT)
Dept: PSYCHOLOGY | Facility: CLINIC | Age: 32
End: 2017-05-15
Payer: MEDICAID

## 2017-05-15 DIAGNOSIS — F43.29 ADJUSTMENT REACTION WITH PREDOMINANT DISTURBANCE OF EMOTIONS: Primary | ICD-10-CM

## 2017-05-15 PROCEDURE — 90834 PSYTX W PT 45 MINUTES: CPT | Performed by: PSYCHOLOGIST

## 2017-05-15 ASSESSMENT — ANXIETY QUESTIONNAIRES
3. WORRYING TOO MUCH ABOUT DIFFERENT THINGS: SEVERAL DAYS
5. BEING SO RESTLESS THAT IT IS HARD TO SIT STILL: SEVERAL DAYS
6. BECOMING EASILY ANNOYED OR IRRITABLE: MORE THAN HALF THE DAYS
GAD7 TOTAL SCORE: 7
2. NOT BEING ABLE TO STOP OR CONTROL WORRYING: SEVERAL DAYS
7. FEELING AFRAID AS IF SOMETHING AWFUL MIGHT HAPPEN: SEVERAL DAYS
1. FEELING NERVOUS, ANXIOUS, OR ON EDGE: SEVERAL DAYS

## 2017-05-15 ASSESSMENT — PATIENT HEALTH QUESTIONNAIRE - PHQ9: 5. POOR APPETITE OR OVEREATING: NOT AT ALL

## 2017-05-15 NOTE — PROGRESS NOTES
Progress Note    Client Name: Berna Cheung  Date: 5/15/2017         Service Type: Consult Note      Session Start Time: 1:00  Session End Time: 1:45      Session Length: 45     Session #: 16     Attendees: Client attended alone    Treatment Plan Last Reviewed: 1/30/2017, 2/27/2017, 4/10/2017, 4/17/2017, 4/24/2017, 5/1/2017, 5/8/2017, 5/15/2017     DATA      Progress Since Last Session (Related to Symptoms / Goals / Homework):   Symptoms: Francisca is doing well.  She was worried about how she would react on Mother's Day but she spent the day with her brother and had a very nice day. They talked a lot about their mother.  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     Episode of Care Goals: Adjust to mother's Alzheimer's disease.  Restart her life after being the sole care giver to her mother.  Resolve grief and loss issues.      Current / Ongoing Stressors and Concerns:   Mother has Alzheimer's,  Cannot be left alone, caring for her own child as well.     Treatment Objective(s) Addressed in This Session:   Develop and implement a plan for the care of her mother as well as restarting her own independent life.       Intervention:   Motivational interviewing, facilitate appropriate grieving, assist with planning.        ASSESSMENT: Current Emotional / Mental Status (status of significant symptoms):   Risk status (Self / Other harm or suicidal ideation)   Client denies current fears or concerns for personal safety.   Client denies current or recent suicidal ideation or behaviors.   Client denies current or recent homicidal ideation or behaviors.   Client denies current or recent self injurious behavior or ideation.   Client denies other safety concerns.   A safety and risk management plan has not been developed at this time,  however client was given the after-hours number / 911 should there be a change in any of these risk factors.     Appearance:   Appropriate    Eye Contact:   Good    Psychomotor Behavior: Normal    Attitude:   Cooperative    Orientation:   All   Speech    Rate / Production: Normal     Volume:  Normal    Mood:    Depressed  Normal   Affect:    Appropriate    Thought Content:  Clear    Thought Form:  Coherent  Logical    Insight:    Good      Medication Review:   No changes to current psychiatric medication(s)     Medication Compliance:   Yes     Changes in Health Issues:   None reported     Chemical Use Review:   Substance Use: Chemical use reviewed, no active concerns identified      Tobacco Use: No change in amount of tobacco use since last session.  Patient declined discussion at this time     Collateral Reports Completed:   Not Applicable    PLAN: (Client Tasks / Therapist Tasks / Other)  Francisca will continue working on grief and loss issues and on getting her life back on track.        Bert Pablo LP                                                         ________________________________________________________________________    Treatment Plan    Client's Name: Berna Cheung  YOB: 1985    Date: 6/16/2016    DSM-V Diagnoses: Adjustment DO with Anxiety and Depression  Psychosocial / Contextual Factors: Caring for mother with Alzheimer's  WHODAS: 24    Referral / Collaboration:  Referral to another professional/service is not indicated at this time..    Anticipated number of session or this episode of care: 20      MeasurableTreatment Goal(s) related to diagnosis / functional impairment(s)  Goal 1:  Reduce client's self report of symptoms from an 8 to a 4 on a 1 to 10 scale.       Objective #A (Client Action)    Work on coping strategies and making the adjustment to life without her mother    Intervention(s)  Motivational interviewing, facilitate appropriate grieving, assist with  planning.      Client and Parent / Guardian has reviewed and agreed to the above plan.      Bert Pablo, FARHAD  June 14, 2016

## 2017-05-15 NOTE — MR AVS SNAPSHOT
"                  MRN:[id number]                      After Visit Summary   5/15/2017    Berna Cheung    MRN: [id number]           Visit Information        Provider Department      5/15/2017 1:00 PM Bert Pablo, FARHAD [facility] Generic      Your next 10 appointments already scheduled     May 22, 2017  1:00 PM CDT   Return Visit with Bert Pablo LP   [facility] ([facility])    [address]  [place] [address]   [phone]            2017 11:00 AM CDT   Return Visit with Jennyfer Mendoza, [facility] ([facility] [place])    [address]  [place] [address]   [phone]              MyChart Information     Navarikhart lets you send messages to your doctor, view your test results, renew your prescriptions, schedule appointments and more. To sign up, go to [email or web address] . Click on \"Log in\" on the left side of the screen, which will take you to the Welcome page. Then click on \"Sign up Now\" on the right side of the page.     You will be asked to enter the access code listed below, as well as some personal information. Please follow the directions to create your username and password.     Your access code is: [email or web address]  Expires: 2017  3:02 PM     Your access code will  in 90 days. If you need help or a new code, please call your [facility] clinic or [phone].        Care EveryWhere ID     This is your Care EveryWhere ID. This could be used by other organizations to access your [facility] medical records  [id number]        "

## 2017-05-16 ASSESSMENT — PATIENT HEALTH QUESTIONNAIRE - PHQ9: SUM OF ALL RESPONSES TO PHQ QUESTIONS 1-9: 9

## 2017-05-16 ASSESSMENT — ANXIETY QUESTIONNAIRES: GAD7 TOTAL SCORE: 7

## 2017-05-22 ENCOUNTER — OFFICE VISIT (OUTPATIENT)
Dept: PSYCHOLOGY | Facility: CLINIC | Age: 32
End: 2017-05-22
Payer: MEDICAID

## 2017-05-22 DIAGNOSIS — F43.29 ADJUSTMENT REACTION WITH PREDOMINANT DISTURBANCE OF EMOTIONS: Primary | ICD-10-CM

## 2017-05-22 PROCEDURE — 90834 PSYTX W PT 45 MINUTES: CPT | Performed by: PSYCHOLOGIST

## 2017-05-22 ASSESSMENT — ANXIETY QUESTIONNAIRES
5. BEING SO RESTLESS THAT IT IS HARD TO SIT STILL: SEVERAL DAYS
7. FEELING AFRAID AS IF SOMETHING AWFUL MIGHT HAPPEN: SEVERAL DAYS
2. NOT BEING ABLE TO STOP OR CONTROL WORRYING: SEVERAL DAYS
6. BECOMING EASILY ANNOYED OR IRRITABLE: NEARLY EVERY DAY
3. WORRYING TOO MUCH ABOUT DIFFERENT THINGS: SEVERAL DAYS
1. FEELING NERVOUS, ANXIOUS, OR ON EDGE: SEVERAL DAYS
GAD7 TOTAL SCORE: 9

## 2017-05-22 ASSESSMENT — PATIENT HEALTH QUESTIONNAIRE - PHQ9: 5. POOR APPETITE OR OVEREATING: SEVERAL DAYS

## 2017-05-22 NOTE — MR AVS SNAPSHOT
"                  MRN:[id number]                      After Visit Summary   2017    Berna Cheung    MRN: [id number]           Visit Information        Provider Department      2017 1:00 PM Bert Pablo LP [facility] Generic      Your next 10 appointments already scheduled     2017 11:00 AM CDT   Return Visit with Jennyfer Mendoza, [facility] ([facility])    [address]  [facility] [address]   [phone]              MyChart Information     SmartFlow Technologieshart lets you send messages to your doctor, view your test results, renew your prescriptions, schedule appointments and more. To sign up, go to [email or web address] . Click on \"Log in\" on the left side of the screen, which will take you to the Welcome page. Then click on \"Sign up Now\" on the right side of the page.     You will be asked to enter the access code listed below, as well as some personal information. Please follow the directions to create your username and password.     Your access code is: [email or web address]  Expires: 2017  3:02 PM     Your access code will  in 90 days. If you need help or a new code, please call your [facility] clinic or [phone].        Care EveryWhere ID     This is your Care EveryWhere ID. This could be used by other organizations to access your [facility] medical records  [id number]        "

## 2017-05-22 NOTE — PROGRESS NOTES
Discharge Summary  Single Session    Client Name: Berna Cheung MRN#: [id number] YOB: 1985    Discharge Date:   May 22, 2017      Service Type: Consult Note      Session Start Time: 1:00  Session End Time: 1:45      Session Length: 45 - 50     Session #: last     Attendees: Client    Focus of Treatment Objective(s):  Client's presenting concerns included: Depression secondary to being the primary care giver for her mother who had late stage dementia.    Stage of Change at time of Discharge: MAINTENANCE (Working to maintain change, with risk of relapse)    Medication Adherence:  NA    Chemical Use:  NA    Assessment: Current Emotional / Mental Status (status of significant symptoms):    Risk status (Self / Other harm or suicidal ideation)  Client denies current fears or concerns for personal safety.  Client denies current or recent suicidal ideation or behaviors.  Client denies current or recent homicidal ideation or behaviors.  Client denies current or recent self injurious behavior or ideation.  Client denies other safety concerns.  A safety and risk management plan has not been developed at this time, however client was given the after-hours number should there be a change in any of these risk factors.    Appearance:   Appropriate   Eye Contact:   Good   Psychomotor Behavior: Normal   Attitude:   Cooperative   Orientation:   All  Speech   Rate / Production: Normal    Volume:  Normal   Mood:    Normal  Affect:    Appropriate   Thought Content:  Clear   Thought Form:  Coherent  Logical   Insight:   Good     DSM5 Diagnoses: (Sustained by DSM5 Criteria Listed Above)  Diagnoses: Adjustment DO with Anxiety and Depression  Psychosocial & Contextual Factors: grieving the death of her mother, going to school, changing careers.  WHODAS 2.0 (12 item) Score: 12    Reason for Discharge:  Client is satisfied with progress and Goals completed      Aftercare Plan:  Client may resume counseling  services at any time in the future by calling the [facility] Intake Office, [phone].  Client will follow-up with Dashawn Mendoza. Referral made by current therapist.      Bert Pablo LP

## 2017-05-23 ASSESSMENT — ANXIETY QUESTIONNAIRES: GAD7 TOTAL SCORE: 9

## 2017-05-23 ASSESSMENT — PATIENT HEALTH QUESTIONNAIRE - PHQ9: SUM OF ALL RESPONSES TO PHQ QUESTIONS 1-9: 8

## 2017-06-01 ENCOUNTER — TELEPHONE (OUTPATIENT)
Dept: FAMILY MEDICINE | Facility: CLINIC | Age: 32
End: 2017-06-01

## 2017-06-01 NOTE — TELEPHONE ENCOUNTER
Panel Management Review      Patient has the following on her problem list: None      Composite cancer screening  Chart review shows that this patient is due/due soon for the following Pap Smear  Summary:    Patient is due/failing the following:   PAP and PHYSICAL    Action needed:   Patient needs office visit for Physical/Pap.    Type of outreach:    Sent letter.    Questions for provider review:    None                                                                                                                                    Rachel Ramsey LPN     Chart routed to Care Team .

## 2017-06-01 NOTE — LETTER
[facility]  [address]  [place] [address]  [phone]                                                                                                           Berna Cheung  [address]  [place] [facility] [address]    June 1, 2017          Dear Berna Cheung,      *We care about your well-being!  In order to ensure we are providing the best quality care, we have reviewed your chart and see that you are due for:     __x___ Physical   __x___ Pap Smear   _____ Mammogram   _____ Diabetes Followup   _____ Hypertension Followup   _____ Cholesterol Followup (Provider Appointment)   _____ Cholesterol Test (Lab-Only Appointment)   _____ Other:       We can assist you in scheduling these appointments at [phone].    If you have had (or plan to have) any of these tests done at a facility other than [facility] or a [facility], please have the results from these tests sent to your primary physician at [facility].             Sincerely,    Nicole Siegler, PA

## 2017-06-05 ENCOUNTER — OFFICE VISIT (OUTPATIENT)
Dept: PSYCHOLOGY | Facility: CLINIC | Age: 32
End: 2017-06-05
Payer: COMMERCIAL

## 2017-06-05 DIAGNOSIS — F41.1 GENERALIZED ANXIETY DISORDER: Primary | ICD-10-CM

## 2017-06-05 PROCEDURE — 90834 PSYTX W PT 45 MINUTES: CPT | Performed by: SOCIAL WORKER

## 2017-06-05 NOTE — MR AVS SNAPSHOT
"                  MRN:[id number]                      After Visit Summary   2017    Berna Cheung    MRN: [id number]           Visit Information        Provider Department      2017 11:00 AM Jennyfer Mendoza LICSW [facility] Generic      Your next 10 appointments already scheduled     2017  2:00 PM CDT   Return Visit with Emerita Diaz   [facility] ([facility])    [address]  [place] [address] [phone]            2017 12:00 PM CDT   Return Visit with Emerita Diaz   [facility] ([facility])    [address]  [place] [facility] [address] [phone]              MyChart Information     Win[employer]hart lets you send messages to your doctor, view your test results, renew your prescriptions, schedule appointments and more. To sign up, go to [email or web address] . Click on \"Log in\" on the left side of the screen, which will take you to the Welcome page. Then click on \"Sign up Now\" on the right side of the page.     You will be asked to enter the access code listed below, as well as some personal information. Please follow the directions to create your username and password.     Your access code is: [email or web address]  Expires: 2017 10:17 AM     Your access code will  in 90 days. If you need help or a new code, please call your [facility] clinic or [phone].        Care EveryWhere ID     This is your Care EveryWhere ID. This could be used by other organizations to access your [facility] medical records  [id number]        "

## 2017-06-06 ASSESSMENT — ANXIETY QUESTIONNAIRES
1. FEELING NERVOUS, ANXIOUS, OR ON EDGE: SEVERAL DAYS
GAD7 TOTAL SCORE: 8
7. FEELING AFRAID AS IF SOMETHING AWFUL MIGHT HAPPEN: SEVERAL DAYS
6. BECOMING EASILY ANNOYED OR IRRITABLE: MORE THAN HALF THE DAYS
5. BEING SO RESTLESS THAT IT IS HARD TO SIT STILL: SEVERAL DAYS
2. NOT BEING ABLE TO STOP OR CONTROL WORRYING: SEVERAL DAYS
3. WORRYING TOO MUCH ABOUT DIFFERENT THINGS: SEVERAL DAYS
IF YOU CHECKED OFF ANY PROBLEMS ON THIS QUESTIONNAIRE, HOW DIFFICULT HAVE THESE PROBLEMS MADE IT FOR YOU TO DO YOUR WORK, TAKE CARE OF THINGS AT HOME, OR GET ALONG WITH OTHER PEOPLE: SOMEWHAT DIFFICULT

## 2017-06-06 ASSESSMENT — PATIENT HEALTH QUESTIONNAIRE - PHQ9: 5. POOR APPETITE OR OVEREATING: SEVERAL DAYS

## 2017-06-06 NOTE — PROGRESS NOTES
Progress Note    Client Name: Berna Cheung  Date: 6/5/2017           Service Type: Individual      Session Start Time: 11am Session End Time: 11:45      Session Length: 45     Session #: 17     Attendees: Client attended alone    Treatment Plan Last Reviewed:  5/15/2017     DATA      Progress Since Last Session (Related to Symptoms / Goals / Homework):   Symptoms: Continues feeling anxious and down. Would like to continue with 1.1 therapy to work on mood management and grief.   Episode of Care Goals:   Restart her life after being the sole care giver to her mother.  Resolve grief and loss issues.      Current / Ongoing Stressors and Concerns:   Mothers death in 12/16 and re adjustment to life and to being a single parent of her 1 yo son. In school full time.   Treatment Objective(s) Addressed in This Session:   Manage life stressors and grief.   Intervention:   Motivational interviewing, facilitate appropriate grieving, assist with planning.        ASSESSMENT: Current Emotional / Mental Status (status of significant symptoms):   Risk status (Self / Other harm or suicidal ideation)   Client denies current fears or concerns for personal safety.   Client denies current or recent suicidal ideation or behaviors.   Client denies current or recent homicidal ideation or behaviors.   Client denies current or recent self injurious behavior or ideation.   Client denies other safety concerns.   A safety and risk management plan has not been developed at this time, however client was given the after-hours number / 911 should there be a change in any of these risk factors.     Appearance:   Appropriate    Eye Contact:   Good    Psychomotor Behavior: Normal    Attitude:   Cooperative    Orientation:   All   Speech    Rate / Production: Normal     Volume:  Normal    Mood:    Anxious  Depressed    Affect:    Appropriate    Thought Content:  Clear    Thought Form:  Coherent  Logical     Insight:    Good      Medication Review:   No changes to current psychiatric medication(s)     Medication Compliance:   Yes     Changes in Health Issues:   None reported     Chemical Use Review:   Substance Use: Chemical use reviewed, no active concerns identified      Tobacco Use: No change in amount of tobacco use since last session.  Patient declined discussion at this time     Collateral Reports Completed:   Not Applicable    PLAN: (Client Tasks / Therapist Tasks / Other)  Francisca will continue working on grief and loss issues and on getting her life back on track. She only has mondays off and this provider does not work on mondays. Ct has been referred to Emerita Diaz via [facility] Intake office.        BENJAMIN Ortega                                                         ________________________________________________________________________    Treatment Plan    Client's Name: Berna Cheung  YOB: 1985    Date: 6/16/2016    DSM-V Diagnoses: Adjustment DO with Anxiety and Depression  Psychosocial / Contextual Factors: Caring for mother with Alzheimer's  WHODAS: 24    Referral / Collaboration:  Referral to another professional/service is not indicated at this time..    Anticipated number of session or this episode of care: 20      MeasurableTreatment Goal(s) related to diagnosis / functional impairment(s)  Goal 1:  Reduce client's self report of symptoms from an 8 to a 4 on a 1 to 10 scale.       Objective #A (Client Action)    Work on coping strategies and making the adjustment to life without her mother    Intervention(s)  Motivational interviewing, facilitate appropriate grieving, assist with planning.      Client and Parent / Guardian has reviewed and agreed to the above plan.      BENJAMIN Ortega  June 14, 2016

## 2017-06-07 ASSESSMENT — PATIENT HEALTH QUESTIONNAIRE - PHQ9: SUM OF ALL RESPONSES TO PHQ QUESTIONS 1-9: 9

## 2017-06-07 ASSESSMENT — ANXIETY QUESTIONNAIRES: GAD7 TOTAL SCORE: 8

## 2017-07-01 ENCOUNTER — HOSPITAL ENCOUNTER (EMERGENCY)
Facility: CLINIC | Age: 32
Discharge: HOME | End: 2017-07-01
Attending: EMERGENCY MEDICINE | Admitting: EMERGENCY MEDICINE
Payer: COMMERCIAL

## 2017-07-01 VITALS
BODY MASS INDEX: 36.39 KG/M2 | DIASTOLIC BLOOD PRESSURE: 64 MMHG | SYSTOLIC BLOOD PRESSURE: 115 MMHG | TEMPERATURE: 98 F | OXYGEN SATURATION: 99 % | WEIGHT: 212 LBS | RESPIRATION RATE: 16 BRPM

## 2017-07-01 DIAGNOSIS — H10.31 ACUTE CONJUNCTIVITIS OF RIGHT EYE, UNSPECIFIED ACUTE CONJUNCTIVITIS TYPE: ICD-10-CM

## 2017-07-01 PROCEDURE — 99282 EMERGENCY DEPT VISIT SF MDM: CPT

## 2017-07-01 RX ORDER — POLYMYXIN B SULFATE AND TRIMETHOPRIM 1; 10000 MG/ML; [USP'U]/ML
1 SOLUTION OPHTHALMIC
Qty: 1 BOTTLE | Refills: 0 | Status: SHIPPED | OUTPATIENT
Start: 2017-07-01 | End: 2017-07-08

## 2017-07-01 ASSESSMENT — ENCOUNTER SYMPTOMS
EYE PAIN: 1
EYE DISCHARGE: 1
EYE ITCHING: 0
PHOTOPHOBIA: 0
EYE REDNESS: 1

## 2017-07-01 ASSESSMENT — VISUAL ACUITY
OD: OTHER (SEE COMMENTS)
OS: OTHER (SEE COMMENTS)

## 2017-07-01 NOTE — DISCHARGE INSTRUCTIONS
"Discharge Instructions  Conjunctivitis  Conjunctivitis, or \"pinkeye\", is inflammation of the conjunctiva which is the thin membrane that lines the inner surface of the eyelids and the whites of the eyes.   There are four main types of conjunctivitis: viral, bacterial, allergic, and non-specific. Both bacterial and viral conjunctivitis spread easily from one person to another by contact with the eye or another person s hands, by an object the infected person has touched, such as a door handle, or by sharing an object that has touched their eye such as a towel or pillow case. Because of this, children with bacterial conjunctivitis can t go back to school or  until they have been on antibiotic drops for 24 hours.  VIRAL CONJUNCTIVITIS:  This is typically caused by the virus that also causes the common cold and is often seen as part of a general cold.  This type of conjunctivitis is not treated with antibiotics, and usually lasts 3 - 5 days.  An over the counter antihistamine/decongestant eye drop may help to relieve the itching and irritation of viral conjunctivitis.  BACTERIAL CONJUNCTIVITIS:  This is treated with an antibiotic ointment or eye drop.  In both bacterial and viral conjunctivitis, do not wear contact lenses until your eye is no longer red.   Your contact case should be thrown away and the contacts disinfected overnight, or replaced if disposable.  NON SPECIFIC CONJUNCTIVITIS: Sometimes a red eye is caused by other things such as dry eye, chemical exposure, or foreign body in the eye such as dust or eyelash.   All of these problems generally improve on their own within 24 hours.  ALLERGIC CONJUNCTIVITIS: These are eye symptoms caused by allergies. This type of conjunctivitis will be treated with allergy medications.    Return to the Emergency Department if:    If you have blurry vision.    If you have increasing eye pain or drainage.    If you have redness or swelling in the skin around the " eye.    If you have a fever.    Follow-up with your doctor:      Your eye should improve within 2 days, if it does not, return to the Emergency Department or see your regular doctor for a second eye exam.  If you were given a prescription for medicine here today, be sure to read all of the information (including the package insert) that comes with your prescription.  This will include important information about the medicine, its side effects, and any warnings that you need to know about.  The pharmacist who fills the prescription can provide more information and answer questions you may have about the medicine.  If you have questions or concerns that the pharmacist cannot address, please call or return to the Emergency Department.       Opioid Medication Information    Pain medications are among the most commonly prescribed medicines, so we are including this information for all our patients. If you did not receive pain medication or get a prescription for pain medicine, you can ignore it.     You may have been given a prescription for an opioid (narcotic) pain medicine and/or have received a pain medicine while here in the Emergency Department. These medicines can make you drowsy or impaired. You must not drive, operate dangerous equipment, or engage in any other dangerous activities while taking these medications. If you drive while taking these medications, you could be arrested for DUI, or driving under the influence. Do not drink any alcohol while you are taking these medications.     Opioid pain medications can cause addiction. If you have a history of chemical dependency of any type, you are at a higher risk of becoming addicted to pain medications.  Only take these prescribed medications to treat your pain when all other options have been tried. Take it for as short a time and as few doses as possible. Store your pain pills in a secure place, as they are frequently stolen and provide a dangerous opportunity  for children or visitors in your house to start abusing these powerful medications. We will not replace any lost or stolen medicine.  As soon as your pain is better, you should flush all your remaining medication.     Many prescription pain medications contain Tylenol  (acetaminophen), including Vicodin , Tylenol #3 , Norco , Lortab , and Percocet .  You should not take any extra pills of Tylenol  if you are using these prescription medications or you can get very sick.  Do not ever take more than 3000 mg of acetaminophen in any 24 hour period.    All opioids tend to cause constipation. Drink plenty of water and eat foods that have a lot of fiber, such as fruits, vegetables, prune juice, apple juice and high fiber cereal.  Take a laxative if you don t move your bowels at least every other day. Miralax , Milk of Magnesia, Colace , or Senna  can be used to keep you regular.      Remember that you can always come back to the Emergency Department if you are not able to see your regular doctor in the amount of time listed above, if you get any new symptoms, or if there is anything that worries you.

## 2017-07-01 NOTE — ED PROVIDER NOTES
"  History     Chief Complaint:  Eye Problem     HPI   Berna Cheung is a 32 year old female who presents to the emergency department today for evaluation of an eye problem. The patient reports yesterday she developed right eye burning and redness as well as visual disturbance secondary to a green drainage. She describes as feeling like her eye is irritated. Due to persistent symptoms and concern for pink eye, she presents to the ED for further evaluation. She also states her son has \"goo\" in his eyes at home. She is able to see through her eye currently.     Allergies:  No Known Drug Allergies     Medications:    Pseudoephedrine HCl (SUDAFED PO)  loratadine (CLARITIN) 10 MG tablet  methocarbamol (ROBAXIN) 500 MG tablet  sertraline (ZOLOFT) 50 MG tablet  albuterol (PROAIR HFA, PROVENTIL HFA, VENTOLIN HFA) 108 (90 BASE) MCG/ACT inhaler  senna-docusate (SENOKOT-S;PERICOLACE) 8.6-50 MG per tablet    Past Medical History:    Generalized anxiety disorder  Gestational diabetes    Past Surgical History:    History reviewed. No pertinent past surgical history.    Family History:    Alzheimer Disease  Diabetes    Social History:  The patient was alone.  Smoking Status: Current  Smokeless Tobacco: Never  Alcohol Use: No  Marital Status:  Single      Review of Systems   Eyes: Positive for pain (right), discharge (green), redness (right) and visual disturbance (improved). Negative for photophobia and itching.   All other systems reviewed and are negative.    Physical Exam   First Vitals:  BP: 131/78  Heart Rate: 109  Temp: 98  F (36.7  C)  Weight: 96.2 kg (212 lb)  SpO2: 99 %      Physical Exam  General: Patient in no acute distress.  Alert and cooperative with exam. Normal mentation  HEENT:  Right eye with conjunctival injection and mattering. No pain with extraocular movements. No evidence of injury or foreign body.   Respiratory: Breathing comfortably on room air  CV: Normal rate, all extremities well perfused  GI:  " Non-distended abdomen  Skin: Warm, dry, no rashes/open wounds on exposed skin  Musculoskeletal: No obvious deformities  Neuro: Alert, answers questions appropriately. No gross motor deficits    Emergency Department Course     Emergency Department Course:  Nursing notes and vitals reviewed.  I performed an exam of the patient as documented above.   I discussed the treatment plan with the patient. They expressed understanding of this plan and consented to discharge. They will be discharged home with instructions for care and follow up. In addition, the patient will return to the emergency department if their symptoms persist, worsen, if new symptoms arise or if there is any concern.  All questions were answered.    Impression & Plan      Medical Decision Making:  Berna Cheung is a 32 year old female who presents for evaluation of a red eye.  A broad differential diagnosis was considered including bacterial conjunctivitis, viral conjunctivitis, foreign body, corneal abrasion, chemical vs allergic conjunctivitis, corneal ulcer, HSV, herpes zoster opthalmicus, endopthalmitis, orbital cellulitis, etc.  Signs and symptoms consistent with a conjunctivitis, likely bacterial. Symptoms started shortly after a family member had similar symptoms making infectious conjunctivitis most likely etiology.  Will start antibiotics and have close follow-up of eye physician.  No red flag symptoms to suggest any of the above worrisome etiologies.     Diagnosis:    ICD-10-CM    1. Acute conjunctivitis of right eye, unspecified acute conjunctivitis type H10.31      Disposition:   Discharged to home with the below prescription     Discharge Medications:  New Prescriptions    TRIMETHOPRIM-POLYMYXIN B (POLYTRIM) OPHTHALMIC SOLUTION    Apply 1 drop to eye every 3 hours for 7 days       Scribe Disclosure:  Jaylyn HOOVER, am serving as a scribe at 12:48 AM on 7/1/2017 to document services personally performed by Amita Juarez MD, based on my  observations and the provider's statements to me.    7/1/2017    EMERGENCY DEPARTMENT       Amita Juarez MD  07/01/17 0517

## 2017-07-01 NOTE — ED AVS SNAPSHOT
"  Emergency Department    [address] [facility] [address]    Phone:  [phone]    Fax:  [phone]                                       Berna Cheung   MRN: [id number]    Department:   Emergency Department   Date of Visit:  7/1/2017           Patient Information     Date Of Birth          1985        Your diagnoses for this visit were:     Acute conjunctivitis of right eye, unspecified acute conjunctivitis type        You were seen by Amita Juarez MD.      Follow-up Information     Schedule an appointment as soon as possible for a visit with Shakila Sandoval MD.    Specialty:  OB/Gyn    Why:  As needed    Contact information:    [facility]  [address] [facility] [address]  [facility] [address] [phone]          Follow up with  Emergency Department.    Specialty:  EMERGENCY MEDICINE    Why:  As needed    Contact information:    [address] [facility] [address] [phone]        Discharge Instructions       Discharge Instructions  Conjunctivitis  Conjunctivitis, or \"pinkeye\", is inflammation of the conjunctiva which is the thin membrane that lines the inner surface of the eyelids and the whites of the eyes.   There are four main types of conjunctivitis: viral, bacterial, allergic, and non-specific. Both bacterial and viral conjunctivitis spread easily from one person to another by contact with the eye or another person s hands, by an object the infected person has touched, such as a door handle, or by sharing an object that has touched their eye such as a towel or pillow case. Because of this, children with bacterial conjunctivitis can t go back to school or  until they have been on antibiotic drops for 24 hours.  VIRAL CONJUNCTIVITIS:  This is typically caused by the virus that also causes the common cold and is often seen as part of a general cold.  This type of conjunctivitis is not treated with antibiotics, and usually lasts 3 - 5 " days.  An over the counter antihistamine/decongestant eye drop may help to relieve the itching and irritation of viral conjunctivitis.  BACTERIAL CONJUNCTIVITIS:  This is treated with an antibiotic ointment or eye drop.  In both bacterial and viral conjunctivitis, do not wear contact lenses until your eye is no longer red.   Your contact case should be thrown away and the contacts disinfected overnight, or replaced if disposable.  NON SPECIFIC CONJUNCTIVITIS: Sometimes a red eye is caused by other things such as dry eye, chemical exposure, or foreign body in the eye such as dust or eyelash.   All of these problems generally improve on their own within 24 hours.  ALLERGIC CONJUNCTIVITIS: These are eye symptoms caused by allergies. This type of conjunctivitis will be treated with allergy medications.    Return to the Emergency Department if:    If you have blurry vision.    If you have increasing eye pain or drainage.    If you have redness or swelling in the skin around the eye.    If you have a fever.    Follow-up with your doctor:      Your eye should improve within 2 days, if it does not, return to the Emergency Department or see your regular doctor for a second eye exam.  If you were given a prescription for medicine here today, be sure to read all of the information (including the package insert) that comes with your prescription.  This will include important information about the medicine, its side effects, and any warnings that you need to know about.  The pharmacist who fills the prescription can provide more information and answer questions you may have about the medicine.  If you have questions or concerns that the pharmacist cannot address, please call or return to the Emergency Department.       Opioid Medication Information    Pain medications are among the most commonly prescribed medicines, so we are including this information for all our patients. If you did not receive pain medication or get a  prescription for pain medicine, you can ignore it.     You may have been given a prescription for an opioid (narcotic) pain medicine and/or have received a pain medicine while here in the Emergency Department. These medicines can make you drowsy or impaired. You must not drive, operate dangerous equipment, or engage in any other dangerous activities while taking these medications. If you drive while taking these medications, you could be arrested for DUI, or driving under the influence. Do not drink any alcohol while you are taking these medications.     Opioid pain medications can cause addiction. If you have a history of chemical dependency of any type, you are at a higher risk of becoming addicted to pain medications.  Only take these prescribed medications to treat your pain when all other options have been tried. Take it for as short a time and as few doses as possible. Store your pain pills in a secure place, as they are frequently stolen and provide a dangerous opportunity for children or visitors in your house to start abusing these powerful medications. We will not replace any lost or stolen medicine.  As soon as your pain is better, you should flush all your remaining medication.     Many prescription pain medications contain Tylenol  (acetaminophen), including Vicodin , Tylenol #3 , Norco , Lortab , and Percocet .  You should not take any extra pills of Tylenol  if you are using these prescription medications or you can get very sick.  Do not ever take more than 3000 mg of acetaminophen in any 24 hour period.    All opioids tend to cause constipation. Drink plenty of water and eat foods that have a lot of fiber, such as fruits, vegetables, prune juice, apple juice and high fiber cereal.  Take a laxative if you don t move your bowels at least every other day. Miralax , Milk of Magnesia, Colace , or Senna  can be used to keep you regular.      Remember that you can always come back to the Emergency  Department if you are not able to see your regular doctor in the amount of time listed above, if you get any new symptoms, or if there is anything that worries you.          24 Hour Appointment Hotline       To make an appointment at any [facility], call [phone] ([phone]). If you don't have a family doctor or clinic, we will help you find one. [facility] clinics are conveniently located to serve the needs of you and your family.             Review of your medicines      START taking        Dose / Directions Last dose taken    trimethoprim-polymyxin b ophthalmic solution   Commonly known as:  POLYTRIM   Dose:  1 drop   Quantity:  1 Bottle        Apply 1 drop to eye every 3 hours for 7 days   Refills:  0          Our records show that you are taking the medicines listed below. If these are incorrect, please call your family doctor or clinic.        Dose / Directions Last dose taken    albuterol 108 (90 BASE) MCG/ACT Inhaler   Commonly known as:  PROAIR HFA/PROVENTIL HFA/VENTOLIN HFA   Dose:  2 puff   Quantity:  1 Inhaler        Inhale 2 puffs into the lungs every 6 hours as needed for shortness of breath / dyspnea or wheezing   Refills:  1        azithromycin 250 MG tablet   Commonly known as:  ZITHROMAX   Quantity:  6 tablet        2 tabs po qd day 1, then 1 tab po qd days 2-5   Refills:  0        desonide 0.05 % ointment   Commonly known as:  DESOWEN   Quantity:  15 g        Apply sparingly once or twice per day as needed to affected area until the skin is better, then stop; repeat as needed   Refills:  1        ibuprofen 400-800 mg tablet   Commonly known as:  ADVIL,MOTRIN   Dose:  400-800 mg   Quantity:  90 tablet        Take 1-2 tablets (400-800 mg) by mouth every 6 hours as needed for other (cramping)   Refills:  1        loratadine 10 MG tablet   Commonly known as:  CLARITIN   Dose:  10 mg        Take 10 mg by mouth daily   Refills:  0        methocarbamol 500 MG tablet   Commonly known as:   ROBAXIN   Dose:  1000 mg   Quantity:  30 tablet        Take 2 tablets (1,000 mg) by mouth 3 times daily as needed for muscle spasms   Refills:  1        PRENATAL VITAMINS PO        Reported on 4/17/2017   Refills:  0        senna-docusate 8.6-50 MG per tablet   Commonly known as:  SENOKOT-S;PERICOLACE   Dose:  1-2 tablet   Quantity:  60 tablet        Take 1-2 tablets by mouth 2 times daily   Refills:  1        sertraline 50 MG tablet   Commonly known as:  ZOLOFT   Quantity:  45 tablet        1/2 tablet (25 mg ) once per day for one week, then 1 tablet per day for 2 weeks, then 2 tablet per day   Refills:  0        SUDAFED PO        Refills:  0        triamcinolone 0.1 % ointment   Commonly known as:  KENALOG   Quantity:  80 g        Apply sparingly once or twice per day as needed to affected area until the skin is better, then stop (do not apply to the face)   Refills:  3                Prescriptions were sent or printed at these locations (1 Prescription)                   Other Prescriptions                Printed at Department/Unit printer (1 of 1)         trimethoprim-polymyxin b (POLYTRIM) ophthalmic solution                Orders Needing Specimen Collection     None      Pending Results     No orders found from 6/29/2017 to 7/2/2017.            Pending Culture Results     No orders found from 6/29/2017 to 7/2/2017.            Pending Results Instructions     If you had any lab results that were not finalized at the time of your Discharge, you can call the ED Lab Result RN at [phone]. You will be contacted by this team for any positive Lab results or changes in treatment. The nurses are available 7 days a week from 10A to 6:30P.  You can leave a message 24 hours per day and they will return your call.        Test Results From Your Hospital Stay               Clinical Quality Measure: Blood Pressure Screening     Your blood pressure was checked while you were in the emergency department today. The last  "reading we obtained was  BP: 115/64 . Please read the guidelines below about what these numbers mean and what you should do about them.  If your systolic blood pressure (the top number) is less than 120 and your diastolic blood pressure (the bottom number) is less than 80, then your blood pressure is normal. There is nothing more that you need to do about it.  If your systolic blood pressure (the top number) is 120-139 or your diastolic blood pressure (the bottom number) is 80-89, your blood pressure may be higher than it should be. You should have your blood pressure rechecked within a year by a primary care provider.  If your systolic blood pressure (the top number) is 140 or greater or your diastolic blood pressure (the bottom number) is 90 or greater, you may have high blood pressure. High blood pressure is treatable, but if left untreated over time it can put you at risk for heart attack, stroke, or kidney failure. You should have your blood pressure rechecked by a primary care provider within the next 4 weeks.  If your provider in the emergency department today gave you specific instructions to follow-up with your doctor or provider even sooner than that, you should follow that instruction and not wait for up to 4 weeks for your follow-up visit.        Thank you for choosing [facility]       Thank you for choosing [facility] for your care. Our goal is always to provide you with excellent care. Hearing back from our patients is one way we can continue to improve our services. Please take a few minutes to complete the written survey that you may receive in the mail after you visit with us. Thank you!        [facility] Information     [employer] lets you send messages to your doctor, view your test results, renew your prescriptions, schedule appointments and more. To sign up, go to [email or web address] . Click on \"Log in\" on the left side of the screen, which will take you to the Welcome page. Then click on \"Sign up " "Now\" on the right side of the page.     You will be asked to enter the access code listed below, as well as some personal information. Please follow the directions to create your username and password.     Your access code is: [email or web address]  Expires: 2017 10:17 AM     Your access code will  in 90 days. If you need help or a new code, please call your [facility] clinic or [phone].        Care EveryWhere ID     This is your Care EveryWhere ID. This could be used by other organizations to access your [facility] medical records  [id number]        Equal Access to Services     [facility] : Garrison Dutton, efrain hinton, jere rivers, carolina hunt . So [facility] [phone].    ATENCIÓN: Si habla español, tiene a arguelles disposición servicios gratuitos de asistencia lingüística. Llame al [phone].    We comply with applicable federal civil rights laws and [place] laws. We do not discriminate on the basis of race, color, national origin, age, disability sex, sexual orientation or gender identity.            After Visit Summary       This is your record. Keep this with you and show to your community pharmacist(s) and doctor(s) at your next visit.                  "

## 2017-07-01 NOTE — ED AVS SNAPSHOT
Emergency Department    [address] [place] [address]    Phone:  [phone]    Fax:  [phone]                                       Berna Cheung   MRN: [id number]    Department:   Emergency Department   Date of Visit:  7/1/2017           After Visit Summary Signature Page     I have received my discharge instructions, and my questions have been answered. I have discussed any challenges I see with this plan with the nurse or doctor.    ..........................................................................................................................................  Patient/Patient Representative Signature      ..........................................................................................................................................  Patient Representative Print Name and Relationship to Patient    ..................................................               ................................................  Date                                            Time    ..........................................................................................................................................  Reviewed by Signature/Title    ...................................................              ..............................................  Date                                                            Time

## 2017-07-01 NOTE — LETTER
EMERGENCY DEPARTMENT  [address] [facility] [address]  [phone]    2017    Berna Cheung  [address]  [facility] [address]  [phone] (home)     : 1985      To Whom it may concern:    Berna Cheung was seen in our Emergency Department today, 2017.  I expect her condition to improve over the next 2-3 days.  She may return to work/school when improved.    Sincerely,        Amita Juarez

## 2017-09-11 ENCOUNTER — OFFICE VISIT (OUTPATIENT)
Dept: URGENT CARE | Facility: URGENT CARE | Age: 32
End: 2017-09-11
Payer: COMMERCIAL

## 2017-09-11 VITALS
SYSTOLIC BLOOD PRESSURE: 112 MMHG | DIASTOLIC BLOOD PRESSURE: 68 MMHG | BODY MASS INDEX: 35.53 KG/M2 | TEMPERATURE: 99.2 F | OXYGEN SATURATION: 98 % | WEIGHT: 207 LBS | HEART RATE: 68 BPM

## 2017-09-11 DIAGNOSIS — J20.9 ACUTE BRONCHITIS WITH SYMPTOMS > 10 DAYS: Primary | ICD-10-CM

## 2017-09-11 DIAGNOSIS — J30.1 SEASONAL ALLERGIC RHINITIS DUE TO POLLEN, UNSPECIFIED CHRONICITY: ICD-10-CM

## 2017-09-11 PROCEDURE — 99214 OFFICE O/P EST MOD 30 MIN: CPT | Performed by: PHYSICIAN ASSISTANT

## 2017-09-11 RX ORDER — FLUTICASONE PROPIONATE 50 MCG
1-2 SPRAY, SUSPENSION (ML) NASAL DAILY
Qty: 1 BOTTLE | Refills: 11 | Status: SHIPPED | OUTPATIENT
Start: 2017-09-11 | End: 2018-01-31

## 2017-09-11 RX ORDER — AZITHROMYCIN 250 MG/1
TABLET, FILM COATED ORAL
Qty: 6 TABLET | Refills: 0 | Status: SHIPPED | OUTPATIENT
Start: 2017-09-11 | End: 2018-01-31

## 2017-09-11 RX ORDER — CODEINE PHOSPHATE AND GUAIFENESIN 10; 100 MG/5ML; MG/5ML
5-10 SOLUTION ORAL
Qty: 120 ML | Refills: 0 | Status: SHIPPED | OUTPATIENT
Start: 2017-09-11 | End: 2018-01-31

## 2017-09-11 NOTE — NURSING NOTE
"Chief Complaint   Patient presents with     Cough   X 3 WEEKS      Initial /68  Pulse 68  Temp 99.2  F (37.3  C) (Oral)  Wt 207 lb (93.9 kg)  SpO2 98%  BMI 35.53 kg/m2 Estimated body mass index is 35.53 kg/(m^2) as calculated from the following:    Height as of 8/29/16: 5' 4\" (1.626 m).    Weight as of this encounter: 207 lb (93.9 kg).  Medication Reconciliation: complete  "

## 2017-09-11 NOTE — PROGRESS NOTES
SUBJECTIVE:   Berna Cheung is a 32 year old female presenting with a chief complaint of nasal congestion, rhinorrhea, cough  and facial pain/pressure.  Onset of symptoms was 3 week(s) ago.  Course of illness is worsening.    Severity moderate  Current and Associated symptoms: nasal congestion, rhinorrhea and cough   Treatment measures tried include OTC meds.  Predisposing factors include recent illness and seasonal allergies    Past Medical History:   Diagnosis Date     Generalized anxiety disorder 2006     History of gestational diabetes     gestational diabestes diet control     ALLERGIES   No Known Allergies      Social History   Substance Use Topics     Smoking status: Current Every Day Smoker     Smokeless tobacco: Never Used      Comment: 1/4 ppd     Alcohol use No       ROS:  CONSTITUTIONAL:NEGATIVE for fever, chills, change in weight  INTEGUMENTARY/SKIN: NEGATIVE for worrisome rashes, moles or lesions  ENT/MOUTH: POSITIVE for nasal drainage, congestion  RESP:POSITIVE for coughing, chest congestion  CV: NEGATIVE for chest pain, palpitations or peripheral edema  GI: NEGATIVE for nausea, abdominal pain, heartburn, or change in bowel habits  MUSCULOSKELETAL: NEGATIVE for significant arthralgias or myalgia  NEURO: NEGATIVE for weakness, dizziness or paresthesias    OBJECTIVE  :/68  Pulse 68  Temp 99.2  F (37.3  C) (Oral)  Wt 207 lb (93.9 kg)  SpO2 98%  BMI 35.53 kg/m2  GENERAL APPEARANCE: healthy, alert and no distress  EYES: EOMI,  PERRL, conjunctiva clear  HENT: TM's normal bilaterally, nasal turbinates erythematous, swollen and rhinorrhea   NECK: supple, nontender, no lymphadenopathy  RESP: lungs clear to auscultation - no rales, rhonchi or wheezes  CV: regular rates and rhythm, normal S1 S2, no murmur noted  NEURO: Normal strength and tone, sensory exam grossly normal,  normal speech and mentation  SKIN: no suspicious lesions or rashes    ASSESSMENT/PLAN:      ICD-10-CM    1. Acute bronchitis with  symptoms > 10 days J20.9 azithromycin (ZITHROMAX) 250 MG tablet     guaiFENesin-codeine (ROBITUSSIN AC) 100-10 MG/5ML SOLN solution   2. Seasonal allergic rhinitis due to pollen, unspecified chronicity J30.1 fluticasone (FLONASE) 50 MCG/ACT spray     Continue to treat allergies with claritin  Follow up with PCP as needed  See orders in Epic

## 2017-09-11 NOTE — MR AVS SNAPSHOT
"              After Visit Summary   2017    Berna Cheung    MRN: [id number]           Patient Information     Date Of Birth          1985        Visit Information        Provider Department      2017 3:55 PM Rafael Radford PA-C [facility]        Today's Diagnoses     Acute bronchitis with symptoms > 10 days    -  1    Seasonal allergic rhinitis due to pollen, unspecified chronicity           Follow-ups after your visit        Who to contact     If you have questions or need follow up information about today's clinic visit or your schedule please contact [facility] directly at [phone].  Normal or non-critical lab and imaging results will be communicated to you by MyChart, letter or phone within 4 business days after the clinic has received the results. If you do not hear from us within 7 days, please contact the clinic through [employer] or phone. If you have a critical or abnormal lab result, we will notify you by phone as soon as possible.  Submit refill requests through [employer] or call your pharmacy and they will forward the refill request to us. Please allow 3 business days for your refill to be completed.          Additional Information About Your Visit        MyChart Information     [employer] lets you send messages to your doctor, view your test results, renew your prescriptions, schedule appointments and more. To sign up, go to [email or web address] . Click on \"Log in\" on the left side of the screen, which will take you to the Welcome page. Then click on \"Sign up Now\" on the right side of the page.     You will be asked to enter the access code listed below, as well as some personal information. Please follow the directions to create your username and password.     Your access code is: [email or web address]  Expires: 12/10/2017  4:53 PM     Your access code will  in 90 days. If you need help or a new code, please call your [place] clinic " or [phone].        Care EveryWhere ID     This is your Care EveryWhere ID. This could be used by other organizations to access your [place] medical records  [id number]        Your Vitals Were     Pulse Temperature Pulse Oximetry BMI (Body Mass Index)          68 99.2  F (37.3  C) (Oral) 98% 35.53 kg/m2         Blood Pressure from Last 3 Encounters:   09/11/17 112/68   07/01/17 115/64   04/17/17 132/78    Weight from Last 3 Encounters:   09/11/17 207 lb (93.9 kg)   07/01/17 212 lb (96.2 kg)   04/17/17 198 lb (89.8 kg)              Today, you had the following     No orders found for display         Today's Medication Changes          These changes are accurate as of: 9/11/17  4:53 PM.  If you have any questions, ask your nurse or doctor.               Start taking these medicines.        Dose/Directions    azithromycin 250 MG tablet   Commonly known as:  ZITHROMAX   Used for:  Acute bronchitis with symptoms > 10 days   Started by:  Rafael Radford PA-C        2 tabs po qd day 1, then 1 tab po qd days 2-5   Quantity:  6 tablet   Refills:  0       fluticasone 50 MCG/ACT spray   Commonly known as:  FLONASE   Used for:  Seasonal allergic rhinitis due to pollen, unspecified chronicity   Started by:  Rafael Radford PA-C        Dose:  1-2 spray   Spray 1-2 sprays into both nostrils daily   Quantity:  1 Bottle   Refills:  11       guaiFENesin-codeine 100-10 MG/5ML Soln solution   Commonly known as:  ROBITUSSIN AC   Used for:  Acute bronchitis with symptoms > 10 days   Started by:  Rafael Radford PA-C        Dose:  5-10 mL   Take 5-10 mLs by mouth nightly as needed for cough   Quantity:  120 mL   Refills:  0            Where to get your medicines      These medications were sent to [facility] [address] [place], [place] - [address] AT [facility] [address], [facility] [address]     Phone:  [phone]     azithromycin 250 MG tablet    fluticasone 50 MCG/ACT spray         Some of  these will need a paper prescription and others can be bought over the counter.  Ask your nurse if you have questions.     Bring a paper prescription for each of these medications     guaiFENesin-codeine 100-10 MG/5ML Soln solution                Primary Care Provider Office Phone # Fax #    Shakila Annalisa Sandoval [phone]       FATIMAH JON CONSULTS [address]  [facility] [address]        Equal Access to Services     [facility] : Hadii aad ku hadasho Soomaali, waaxda luqadaha, qaybta kaalmada adeegyada, waxay idiin hayaan adeeg kharash la'bobn . So [facility] [phone].    ATENCIÓN: Si habla español, tiene a arguelles disposición servicios gratuitos de asistencia lingüística. [facility] [phone].    We comply with applicable federal civil rights laws and [place] laws. We do not discriminate on the basis of race, color, national origin, age, disability sex, sexual orientation or gender identity.            Thank you!     Thank you for choosing [facility]  for your care. Our goal is always to provide you with excellent care. Hearing back from our patients is one way we can continue to improve our services. Please take a few minutes to complete the written survey that you may receive in the mail after your visit with us. Thank you!             Your Updated Medication List - Protect others around you: Learn how to safely use, store and throw away your medicines at www.disposemymeds.org.          This list is accurate as of: 9/11/17  4:53 PM.  Always use your most recent med list.                   Brand Name Dispense Instructions for use Diagnosis    albuterol 108 (90 BASE) MCG/ACT Inhaler    PROAIR HFA/PROVENTIL HFA/VENTOLIN HFA    1 Inhaler    Inhale 2 puffs into the lungs every 6 hours as needed for shortness of breath / dyspnea or wheezing    Viral URI with cough       azithromycin 250 MG tablet    ZITHROMAX    6 tablet    2 tabs po qd day 1, then 1 tab po  qd days 2-5    Acute bronchitis with symptoms > 10 days       desonide 0.05 % ointment    DESOWEN    15 g    Apply sparingly once or twice per day as needed to affected area until the skin is better, then stop; repeat as needed    Eczema, unspecified eczema       fluticasone 50 MCG/ACT spray    FLONASE    1 Bottle    Spray 1-2 sprays into both nostrils daily    Seasonal allergic rhinitis due to pollen, unspecified chronicity       guaiFENesin-codeine 100-10 MG/5ML Soln solution    ROBITUSSIN AC    120 mL    Take 5-10 mLs by mouth nightly as needed for cough    Acute bronchitis with symptoms > 10 days       ibuprofen 400-800 mg tablet    ADVIL,MOTRIN    90 tablet    Take 1-2 tablets (400-800 mg) by mouth every 6 hours as needed for other (cramping)    Indication for care in labor or delivery       loratadine 10 MG tablet    CLARITIN     Take 10 mg by mouth daily        methocarbamol 500 MG tablet    ROBAXIN    30 tablet    Take 2 tablets (1,000 mg) by mouth 3 times daily as needed for muscle spasms    Muscle strain of right upper back, initial encounter, Left ankle injury, initial encounter       PRENATAL VITAMINS PO      Reported on 4/17/2017        senna-docusate 8.6-50 MG per tablet    SENOKOT-S;PERICOLACE    60 tablet    Take 1-2 tablets by mouth 2 times daily    Indication for care in labor or delivery       sertraline 50 MG tablet    ZOLOFT    45 tablet    1/2 tablet (25 mg ) once per day for one week, then 1 tablet per day for 2 weeks, then 2 tablet per day    Generalized anxiety disorder       SUDAFED PO           triamcinolone 0.1 % ointment    KENALOG    80 g    Apply sparingly once or twice per day as needed to affected area until the skin is better, then stop (do not apply to the face)    Psoriasis

## 2017-11-16 ENCOUNTER — TELEPHONE (OUTPATIENT)
Dept: FAMILY MEDICINE | Facility: CLINIC | Age: 32
End: 2017-11-16

## 2017-11-16 NOTE — LETTER
November 16, 2017    Berna Cheung  [address] [facility] [address]    Dear Viri Coronel cares about your health and your health plan.  I have reviewed your medical conditions, medication list and lab results, and am making recommendations based on this review to better manage your health.    You are in particular need of attention regarding:  -Cervical Cancer Screening  -Wellness (Physical) Visit     I am recommending that you:     -schedule a WELLNESS (Physical) APPOINTMENT with me.   I will check fasting labs the same day - nothing to eat except water and meds for 8-10 hours prior. (If you go elsewhere for Wellness visits then please disregard this reminder.)    -schedule a PAP SMEAR EXAM which is due.  Please disregard this reminder if you have had this exam elsewhere within the last year.  It would be helpful for us to have a copy of your recent pap smear report in our file so that we can best coordinate your care.    If you are under/uninsured, we recommend you contact the [facility]. They offer pap smears at no charge or on a sliding fee charge. You can schedule with them at [phone]. Please have them send us the results.      Please call us at the [facility] location:  [phone] or use [employer] to address the above recommendations.     Thank you for trusting [facility].  We appreciate the opportunity to serve you and look forward to supporting your healthcare in the future.    If you have (or plan to have) any of these tests done at a facility other than a [facility] or a [facility], please have the results sent to the [facility] location noted above.      Best Regards,    Nicole Siegler, PA

## 2018-01-31 ENCOUNTER — OFFICE VISIT (OUTPATIENT)
Dept: PHARMACY | Facility: CLINIC | Age: 33
End: 2018-01-31
Payer: COMMERCIAL

## 2018-01-31 VITALS
HEIGHT: 64 IN | DIASTOLIC BLOOD PRESSURE: 88 MMHG | BODY MASS INDEX: 34.83 KG/M2 | HEART RATE: 77 BPM | SYSTOLIC BLOOD PRESSURE: 129 MMHG | WEIGHT: 204 LBS

## 2018-01-31 DIAGNOSIS — R63.4 WEIGHT LOSS: Primary | ICD-10-CM

## 2018-01-31 DIAGNOSIS — Z23 NEED FOR IMMUNIZATION AGAINST INFLUENZA: ICD-10-CM

## 2018-01-31 DIAGNOSIS — E63.9 NUTRITIONAL DISORDER: ICD-10-CM

## 2018-01-31 DIAGNOSIS — Z72.0 TOBACCO ABUSE: ICD-10-CM

## 2018-01-31 PROCEDURE — 99605 MTMS BY PHARM NP 15 MIN: CPT | Performed by: PHARMACIST

## 2018-01-31 PROCEDURE — 99607 MTMS BY PHARM ADDL 15 MIN: CPT | Performed by: PHARMACIST

## 2018-01-31 RX ORDER — POLYETHYLENE GLYCOL 3350 17 G
2 POWDER IN PACKET (EA) ORAL
COMMUNITY
End: 2019-10-17

## 2018-01-31 NOTE — MR AVS SNAPSHOT
"              After Visit Summary   1/31/2018    Berna Cheung    MRN: [id number]           Patient Information     Date Of Birth          1985        Visit Information        Provider Department      1/31/2018 8:00 AM Zaira Trent, [facility] [place]        Today's Diagnoses     Weight loss    -  1    Tobacco abuse        Nutritional disorder        Need for immunization against influenza          Care Instructions    Recommendations from today's MTM visit:                                                    MTM (medication therapy management) is a service provided by a clinical pharmacist designed to help you get the most of out of your medicines.   Today we reviewed what your medicines are for, how to know if they are working, that your medicines are safe and how to make your medicine regimen as easy as possible.     1.  Consider purchasing nicotine gum or lozenges - 2mg would be appropriate for you.  You can buy these over the counter, and either can be used as needed.  If you use the gum - chew it until it's peppery and then \"park\" it in your cheek.    2.  Consider following the recommendations for patients with diabetes - 45 grams of carbohydrates with each meal (breakfast, lunch, and dinner) and up to 2 snacks of 15 grams of carbohydrates.    3.  Consider starting calcium/vitmain D twice a day since you're not getting a lot of dietary calcium intake.    4.  Talk with your OB/GYN about either topiramate or Victoza for weight loss.  These are the only two options covered by your insurance.    Next MTM visit: Let us know what your OB thinks    To schedule another MTM appointment, please call the clinic directly or you may call the MTM scheduling line at [phone] or toll-free at [phone].     My Clinical Pharmacist's contact information:                                                      It was a pleasure seeing you today!  Please feel free to contact me with any questions or " "concerns you have.      Colette Mistry PharmD  Pharmaceutical Care Resident  Pager: [phone]    Zaira Trent PharmD, [facility]  Medication Therapy Management Provider  Pager: [phone]     You may receive a survey about the [facility] services you received.  I would appreciate your feedback to help me serve you better in the future. Please fill it out and return it when you can. Your comments will be anonymous.                   Follow-ups after your visit        Who to contact     If you have questions or need follow up information about today's clinic visit or your schedule please contact [facility] directly at [phone].  Normal or non-critical lab and imaging results will be communicated to you by MyChart, letter or phone within 4 business days after the clinic has received the results. If you do not hear from us within 7 days, please contact the clinic through [employer] or phone. If you have a critical or abnormal lab result, we will notify you by phone as soon as possible.  Submit refill requests through [employer] or call your pharmacy and they will forward the refill request to us. Please allow 3 business days for your refill to be completed.          Additional Information About Your Visit        MyChart Information     [facility] lets you send messages to your doctor, view your test results, renew your prescriptions, schedule appointments and more. To sign up, go to [email or web address] . Click on \"Log in\" on the left side of the screen, which will take you to the Welcome page. Then click on \"Sign up Now\" on the right side of the page.     You will be asked to enter the access code listed below, as well as some personal information. Please follow the directions to create your username and password.     Your access code is: [email or web address]  Expires: 2018  9:11 AM     Your access code will  in 90 days. If you need help or a new code, please call your [place] clinic or [phone].   " "     Care EveryWhere ID     This is your Care EveryWhere ID. This could be used by other organizations to access your [place] medical records  [id number]        Your Vitals Were     Pulse Height BMI (Body Mass Index)             77 5' 4\" (1.626 m) 35.02 kg/m2          Blood Pressure from Last 3 Encounters:   01/31/18 129/88   09/11/17 112/68   07/01/17 115/64    Weight from Last 3 Encounters:   01/31/18 204 lb (92.5 kg)   09/11/17 207 lb (93.9 kg)   07/01/17 212 lb (96.2 kg)              Today, you had the following     No orders found for display       Primary Care Provider Office Phone # Fax #    Shakila Annalisa Sandoval [phone]       FATIMAH JON CONSULTS [address]  [place] [address]        Equal Access to Services     [facility]: Hadii aad ku hadasho Soomaali, waaxda luqadaha, qaybta kaalmada adeegyada, waxay idiin hayaan adeguilherme reynosoaan . So [facility] [phone].    ATENCIÓN: Si habla español, tiene a arguelles disposición servicios gratuitos de asistencia lingüística. Silvina al [phone].    We comply with applicable federal civil rights laws and [place] laws. We do not discriminate on the basis of race, color, national origin, age, disability, sex, sexual orientation, or gender identity.            Thank you!     Thank you for choosing [facility]  for your care. Our goal is always to provide you with excellent care. Hearing back from our patients is one way we can continue to improve our services. Please take a few minutes to complete the written survey that you may receive in the mail after your visit with us. Thank you!             Your Updated Medication List - Protect others around you: Learn how to safely use, store and throw away your medicines at www.disposemymeds.org.          This list is accurate as of 1/31/18  9:11 AM.  Always use your most recent med list.                   Brand Name Dispense Instructions for use Diagnosis    " calcium-vitamin D 600-400 MG-UNIT per tablet    CALTRATE     Take 1 tablet by mouth 2 times daily        MULTIVITAMIN & MINERAL PO      Take 1 tablet by mouth daily        NICOTINE MINI 2 MG lozenge   Generic drug:  nicotine polacrilex      Place 2 mg inside cheek every hour as needed for smoking cessation

## 2018-01-31 NOTE — PATIENT INSTRUCTIONS
"Recommendations from today's MTM visit:                                                    MTM (medication therapy management) is a service provided by a clinical pharmacist designed to help you get the most of out of your medicines.   Today we reviewed what your medicines are for, how to know if they are working, that your medicines are safe and how to make your medicine regimen as easy as possible.     1.  Consider purchasing nicotine gum or lozenges - 2mg would be appropriate for you.  You can buy these over the counter, and either can be used as needed.  If you use the gum - chew it until it's peppery and then \"park\" it in your cheek.    2.  Consider following the recommendations for patients with diabetes - 45 grams of carbohydrates with each meal (breakfast, lunch, and dinner) and up to 2 snacks of 15 grams of carbohydrates.    3.  Consider starting calcium/vitmain D twice a day since you're not getting a lot of dietary calcium intake.    4.  Talk with your OB/GYN about either topiramate or Victoza for weight loss.  These are the only two options covered by your insurance.    Next MTM visit: Let us know what your OB thinks    To schedule another MTM appointment, please call the clinic directly or you may call the MTM scheduling line at [phone] or toll-free at [phone].     My Clinical Pharmacist's contact information:                                                      It was a pleasure seeing you today!  Please feel free to contact me with any questions or concerns you have.      Colette Mistry PharmD  Pharmaceutical Care Resident  Pager: [phone]    Zaira Trent PharmD, [facility]  Medication Therapy Management Provider  Pager: [phone]     You may receive a survey about the MTM services you received.  I would appreciate your feedback to help me serve you better in the future. Please fill it out and return it when you can. Your comments will be anonymous.           "

## 2018-01-31 NOTE — PROGRESS NOTES
SUBJECTIVE/OBJECTIVE:                           Berna Cheung is a 33 year old female coming in for an initial visit for Medication Therapy Management.  She was self-referred to me.  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    Chief Complaint: Weight loss, interested in learning more about preventing diabetes    Personal Healthcare Goals: Same as above    Allergies/ADRs: Reviewed in EPIC  Tobacco: 0-1 pack per day - is interested in quitting. She's working on cutting back and is currently smoking about 1 cigarette/day  Alcohol: Special occasions only - about once a month  Caffeine: She's been working on cutting back, previously was drinking about 3 cups/day, now she only drinks 2 cups/week  Activity: She enjoys hiking in the summer months and has been doing some yoga at home this winter    PMH: Reviewed in Epic    Medication Adherence/Access:  No issues identified    Weight Loss:  Pt has a strong family history of type 2 diabetes so she is interested in learning more about what she can do to prevent this from occurring. She did have gestational diabetes during her pregnancy which was treated with dietary changes. She's been attempting weight loss through changes in diet and exercise but feels as though she always hits a plateau. Typical diet: breakfast - banana; lunch - salad; dinner - meat and vegetable; snacks - she tries to avoid but snacks on fruit when she feels she needs something sweet. She states she doesn't consume a lot of dairy products. She's been trying to stay away from eating a lot of carbohydrates and has been substituting rice with cauliflower when able since eating rice is part of her culture. She's also trying to eat lots of fruit and vegetables and to be a good role model for her son. She feels looking at nutrition labels is more discouraging for her. She's working on a balance, not deprivation. At this point, she stated interest in discussing medication  options to assist in weight loss.    Tobacco Abuse: She wonders if there are any medications she can use to help her quit smoking completely. She was smoke-free during her pregnancy, so she knows she can be successful but feels she had more incentive in the past. Notes she does have some friends that smoke, but she feels a little isolated now taking smoking breaks. She has tried nicotine gum in the past and states she kept chewing the gum while it was in her mouth.    Supplements:  She takes a daily MVI, which she feels is effective.  She denies side effects.    Immunizations:  She has not received a flu shot this year.    Current labs include:  BP Readings from Last 3 Encounters:   09/11/17 112/68   07/01/17 115/64   04/17/17 132/78     Today's Vitals: /88  Pulse 77  Wt 204 lb (92.5 kg)  BMI 35.02 kg/m2    Most Recent Immunizations   Administered Date(s) Administered     Influenza (IIV3) PF 09/16/2014       ASSESSMENT:                             Current medications were reviewed today.     Medication Adherence: no issues identified    Weight Loss: Needs Improvement. Pt has made diet modifications but is not satisfied with her weight loss progress. Pt would benefit from re-education regarding carb counting.  Since her BMI is >30 and she has not met weight loss goals with diet/exercise alone, she is a candidate for pharmacologic therapy.  It appears her insurance will only cover topiramate or Victoza (not approved for weight loss).  The goal is for the patient to implement a healthy lifestyle in order to prevent diabetes.    Smoking cessation: Needs Improvement. Pt is ready to quit using tobacco. Patient has tried nicotine gum in the past but did not use it appropriately - she was not aware she had to chew the gum and then place it between her gums and cheek. Based on patient preferences and history, patient would benefit from nicotine gum or nicotine lozenge used appropriately.    Supplements: Needs  Improvement. Since patient does not incorporate much dairy in her diet, she would benefit from starting a calcium/vitamin D supplement.    Immunizations: Needs Improvement. Patient has not received the flu vaccine this season.    PLAN:                            1.  Reviewed carb counting recommendations - she feels she's already doing this.  2.  Discussed pharmacologic options for weight loss.  Encouraged her to discuss further with her PCP (OB/GYN).  Options covered by her insurance include topiramate and Victoza (off-label).  3.  Suggested trial of nicotine gum or lozenges - reviewed appropriate use.  4.  Recommended starting calcium/vitamin D BID.  5.  Recommended annual influenza vaccine, she declined.    I spent 60 minutes with this patient today. I offer these suggestions for consideration by the PCP. A copy of the visit note was provided to the patient's primary care provider.    I've asked Berna to call with an update after talking with her OB/GYN.    The patient was given a summary of these recommendations as an after visit summary.     Emmy See, PharmD IV Student    Zaira Trent, PharmD, [facility]  Medication Therapy Management Provider  Pager: [phone]

## 2018-07-27 ENCOUNTER — TELEPHONE (OUTPATIENT)
Dept: PHARMACY | Facility: CLINIC | Age: 33
End: 2018-07-27

## 2018-07-27 NOTE — TELEPHONE ENCOUNTER
This patient is due for MT follow-up. I called the patient to schedule an appointment and left a message with the clinic phone number for the patient to call to schedule.     Zaira Trent PharmD, [facility]  Medication Therapy Management Provider  Pager: [phone]

## 2018-08-31 ENCOUNTER — TELEPHONE (OUTPATIENT)
Dept: PHARMACY | Facility: CLINIC | Age: 33
End: 2018-08-31

## 2018-08-31 NOTE — TELEPHONE ENCOUNTER
We have been unable to reach this patient for MTM follow-up after several attempts. We will stop reaching out to the patient at this time. Please let us know if we can assist in this patient's care in the future.    Routing to PCP as Jeff CullenD, [facility]  Medication Therapy Management Provider  Pager: [phone]

## 2019-01-22 ENCOUNTER — OFFICE VISIT (OUTPATIENT)
Dept: URGENT CARE | Facility: URGENT CARE | Age: 34
End: 2019-01-22
Payer: COMMERCIAL

## 2019-01-22 VITALS
DIASTOLIC BLOOD PRESSURE: 66 MMHG | TEMPERATURE: 98.4 F | WEIGHT: 202.3 LBS | SYSTOLIC BLOOD PRESSURE: 100 MMHG | HEART RATE: 76 BPM | BODY MASS INDEX: 34.72 KG/M2 | OXYGEN SATURATION: 97 %

## 2019-01-22 DIAGNOSIS — R07.0 THROAT PAIN: Primary | ICD-10-CM

## 2019-01-22 DIAGNOSIS — Z20.818 STREPTOCOCCUS EXPOSURE: ICD-10-CM

## 2019-01-22 LAB
DEPRECATED S PYO AG THROAT QL EIA: NORMAL
SPECIMEN SOURCE: NORMAL

## 2019-01-22 PROCEDURE — 87880 STREP A ASSAY W/OPTIC: CPT | Performed by: PHYSICIAN ASSISTANT

## 2019-01-22 PROCEDURE — 87081 CULTURE SCREEN ONLY: CPT | Performed by: PHYSICIAN ASSISTANT

## 2019-01-22 PROCEDURE — 99214 OFFICE O/P EST MOD 30 MIN: CPT | Performed by: PHYSICIAN ASSISTANT

## 2019-01-22 RX ORDER — AMOXICILLIN 875 MG
875 TABLET ORAL 2 TIMES DAILY
Qty: 20 TABLET | Refills: 0 | Status: SHIPPED | OUTPATIENT
Start: 2019-01-22 | End: 2019-02-01

## 2019-01-22 NOTE — PROGRESS NOTES
SUBJECTIVE:   Berna Cheung is a 33 year old female presenting with a chief complaint of sore throat, sinus congestion, drainage and exposure to strep throat.  Onset of symptoms was 4 day(s) ago.  Course of illness is worsening.    Severity moderate  Current and Associated symptoms: throat pain and sinus pain  Treatment measures tried include OTC medications.  Predisposing factors include strep exposure from son.    Past Medical History:   Diagnosis Date     Generalized anxiety disorder 2006     History of gestational diabetes     gestational diabestes diet control        Allergies   Allergen Reactions     Oxycodone Nausea     Family History   Problem Relation Age of Onset     Alzheimer Disease Mother      Diabetes Father      Diabetes Paternal Grandmother      Diabetes Paternal Grandfather          Social History     Tobacco Use     Smoking status: Current Every Day Smoker     Smokeless tobacco: Never Used     Tobacco comment: 1/4 ppd   Substance Use Topics     Alcohol use: No     Alcohol/week: 0.0 oz       ROS:  CONSTITUTIONAL:POSITIVE  for chills  INTEGUMENTARY/SKIN: NEGATIVE for worrisome rashes, moles or lesions  EYES: NEGATIVE for vision changes or irritation  ENT/MOUTH: POSITIVE for throat pain and sinus congestion  RESP:NEGATIVE for significant cough or SOB  CV: NEGATIVE for chest pain, palpitations or peripheral edema  GI: NEGATIVE for nausea, abdominal pain, heartburn, or change in bowel habits  : normal menstrual cycles  MUSCULOSKELETAL: NEGATIVE for significant arthralgias or myalgia  NEURO: NEGATIVE for weakness, dizziness or paresthesias    OBJECTIVE  :/66   Pulse 76   Temp 98.4  F (36.9  C) (Tympanic)   Wt 91.8 kg (202 lb 4.8 oz)   SpO2 97%   BMI 34.72 kg/m    GENERAL APPEARANCE: healthy, alert and no distress  EYES: EOMI,  PERRL, conjunctiva clear  HENT: TM's normal bilaterally, tonsillar hypertrophy and tonsillar erythema  NECK: no adenopathy  RESP: lungs clear to auscultation - no rales,  rhonchi or wheezes  CV: regular rates and rhythm, normal S1 S2, no murmur noted  NEURO: Normal strength and tone, sensory exam grossly normal,  normal speech and mentation  SKIN: no suspicious lesions or rashes    Results for orders placed or performed in visit on 01/22/19   Strep, Rapid Screen   Result Value Ref Range    Specimen Description Throat     Rapid Strep A Screen       NEGATIVE: No Group A streptococcal antigen detected by immunoassay, await culture report.       ASSESSMENT/PLAN      ICD-10-CM    1. Throat pain R07.0 Strep, Rapid Screen     Beta strep group A culture     magic mouthwash (ENTER INGREDIENTS IN COMMENTS) suspension   2. Streptococcus exposure Z20.818 Strep, Rapid Screen     Beta strep group A culture     amoxicillin (AMOXIL) 875 MG tablet       Orders Placed This Encounter     amoxicillin (AMOXIL) 875 MG tablet     magic mouthwash (ENTER INGREDIENTS IN COMMENTS) suspension       Strep culture pending  Fluids, rest  Follow up with PCP as needed  See orders in Epic

## 2019-01-23 LAB
BACTERIA SPEC CULT: NORMAL
SPECIMEN SOURCE: NORMAL

## 2019-10-17 ENCOUNTER — OFFICE VISIT (OUTPATIENT)
Dept: URGENT CARE | Facility: URGENT CARE | Age: 34
End: 2019-10-17
Payer: COMMERCIAL

## 2019-10-17 VITALS
BODY MASS INDEX: 33.87 KG/M2 | HEART RATE: 68 BPM | OXYGEN SATURATION: 97 % | WEIGHT: 197.3 LBS | SYSTOLIC BLOOD PRESSURE: 120 MMHG | TEMPERATURE: 98.3 F | DIASTOLIC BLOOD PRESSURE: 78 MMHG

## 2019-10-17 DIAGNOSIS — R05.8 PRODUCTIVE COUGH: ICD-10-CM

## 2019-10-17 DIAGNOSIS — J01.90 ACUTE SINUSITIS WITH SYMPTOMS > 10 DAYS: Primary | ICD-10-CM

## 2019-10-17 PROCEDURE — 99213 OFFICE O/P EST LOW 20 MIN: CPT | Performed by: FAMILY MEDICINE

## 2019-10-17 RX ORDER — AZITHROMYCIN 250 MG/1
TABLET, FILM COATED ORAL
Qty: 6 TABLET | Refills: 0 | Status: SHIPPED | OUTPATIENT
Start: 2019-10-17 | End: 2019-10-22

## 2019-10-17 NOTE — PROGRESS NOTES
SUBJECTIVE: Berna Cheung is a 34 year old female here with concerns about sinus infection.  She states onset  of symptoms were greater than 10 days with sinus pressure and drainage.  Course of illness is worsening.    Severity: moderate  Current and Associated symptoms: cold symptoms  Predisposing factors include none.   Recent treatment has included: OTC's  Allergic symptoms include: none    Past Medical History:   Diagnosis Date     Generalized anxiety disorder 2006     History of gestational diabetes     gestational diabestes diet control     Allergies   Allergen Reactions     Oxycodone Nausea     Social History     Tobacco Use     Smoking status: Current Every Day Smoker     Smokeless tobacco: Never Used     Tobacco comment: 1/4 ppd   Substance Use Topics     Alcohol use: No     Alcohol/week: 0.0 standard drinks       ROS:   no rash  no vomiting    OBJECTIVE:  /78   Pulse 68   Temp 98.3  F (36.8  C) (Tympanic)   Wt 89.5 kg (197 lb 4.8 oz)   SpO2 97%   BMI 33.87 kg/m    NAD  EYES: clear, no mattering  EARS: TM's clear, no redness/buldging, canals clear, no swelling/redness  NOSE: discolored discharge hitesh. Nares  THROAT: clear, no erythema, no exudate  SINUS: maxillary tenderness with palpation  LUNGS: CTAB, no rhonchi/wheeze/rales      ICD-10-CM    1. Acute sinusitis with symptoms > 10 days J01.90 azithromycin (ZITHROMAX) 250 MG tablet   2. Productive cough R05 azithromycin (ZITHROMAX) 250 MG tablet       Follow up with primary clinic if not improving

## 2020-08-28 ENCOUNTER — VIRTUAL VISIT (OUTPATIENT)
Dept: FAMILY MEDICINE | Facility: OTHER | Age: 35
End: 2020-08-28
Payer: COMMERCIAL

## 2020-08-28 DIAGNOSIS — Z20.822 SUSPECTED COVID-19 VIRUS INFECTION: Primary | ICD-10-CM

## 2020-08-28 PROCEDURE — 99421 OL DIG E/M SVC 5-10 MIN: CPT | Performed by: PHYSICIAN ASSISTANT

## 2020-08-28 NOTE — PROGRESS NOTES
"Date: 2020 14:58:34  Clinician: Negrito New  Clinician NPI: [id number]  Patient: Berna Cheung  Patient : 1985  Patient Address: [address] [place] [id number]  Patient Phone: [phone]  Visit Protocol: URI  Patient Summary:  Berna is a 35 year old ( : 1985 ) female who initiated a Visit for COVID-19 (Coronavirus) evaluation and screening. When asked the question \"Please sign me up to receive news, health information and promotions from [employer].\", Berna responded \"No\".    Berna states her symptoms started gradually 5-6 days ago. After her symptoms started, they improved and then got worse again.   Her symptoms consist of a headache, malaise, a sore throat, ageusia, diarrhea, a cough, nausea, and myalgia. She is experiencing mild difficulty breathing with activities but can speak normally in full sentences.   Symptom details     Cough: Berna coughs a few times an hour and her cough is more bothersome at night. Phlegm does not come into her throat when she coughs. She does not believe her cough is caused by post-nasal drip.     Sore throat: Berna reports having moderate throat pain (4-6 on a 10 point pain scale), does not have exudate on her tonsils, and can swallow liquids. She is not sure if the lymph nodes in her neck are enlarged. A rash has appeared on the skin since the sore throat started. Berna uploaded photos of her rash (see below).     Headache: She states the headache is mild (1-3 on a 10 point pain scale).      Berna denies having ear pain, chills, fever, wheezing, teeth pain, nasal congestion, vomiting, rhinitis, anosmia, and facial pain or pressure. She also denies having recent facial or sinus surgery in the past 60 days and taking antibiotic medication in the past month.   Precipitating events  Within the past week, Berna has not been exposed to someone with strep throat. She has not recently been exposed to someone with influenza. Berna has been in " close contact with the following high risk individuals: children under the age of 5.   Pertinent COVID-19 (Coronavirus) information  In the past 14 days, Berna has not worked in a congregate living setting.   She does not work or volunteer as healthcare worker or a  and does not work or volunteer in a healthcare facility.   Berna also has not lived in a congregate living setting in the past 14 days. She does not live with a healthcare worker.   Berna has had a close contact with a laboratory-confirmed COVID-19 patient within 14 days of symptom onset.   Since December 2019, Berna and has had upper respiratory infection (URI) or influenza-like illness. Has not been diagnosed with lab-confirmed COVID-19 test      Date(s) of previous URI or influenza-like illness (free-text): January 2029     Symptoms Berna experienced during previous URI or influenza-like illness as reported by the patient (free-text): Fever, vomiting, flu like systoms. Chest congestion's and coughing constantly for one month.        Pertinent medical history  Berna had 2 sinus infections within the past year.   Berna typically gets a yeast infection when she takes antibiotics. She has not used fluconazole (Diflucan) to treat previous yeast infections.   Berna needs a return to work/school note.   Weight: 180 lbs   Berna does not smoke or use smokeless tobacco.   She denies pregnancy and denies breastfeeding. She has menstruated in the past month.   Weight: 180 lbs    MEDICATIONS: No current medications, ALLERGIES: NKDA  Clinician Response:  Dear Berna,   Your symptoms show that you may have coronavirus (COVID-19). This illness can cause fever, cough and trouble breathing. Many people get a mild case and get better on their own. Some people can get very sick.  What should I do?  We would like to test you for this virus.   1. Please call [phone] to schedule your visit. Explain that you were referred by [facility] to  "have a COVID-19 test. Be ready to share your [facility] visit ID number.  The following will serve as your written order for this COVID Test, ordered by me, for the indication of suspected COVID [Z20.828]: The test will be ordered in [employer], our electronic health record, after you are scheduled. It will show as ordered and authorized by Joshua Underwood MD.  Order: COVID-19 (Coronavirus) PCR for SYMPTOMATIC testing from [facility].      2. When it's time for your COVID test:  Stay at least 6 feet away from others. (If someone will drive you to your test, stay in the backseat, as far away from the  as you can.)   Cover your mouth and nose with a mask, tissue or washcloth.  Go straight to the testing site. Don't make any stops on the way there or back.      3.Starting now: Stay home and away from others (self-isolate) until:   You've had no fever---and no medicine that reduces fever---for one full day (24 hours). And...   Your other symptoms have gotten better. For example, your cough or breathing has improved. And...   At least 10 days have passed since your symptoms started.       During this time, don't leave the house except for testing or medical care.   Stay in your own room, even for meals. Use your own bathroom if you can.   Stay away from others in your home. No hugging, kissing or shaking hands. No visitors.  Don't go to work, school or anywhere else.    Clean \"high touch\" surfaces often (doorknobs, counters, handles, etc.). Use a household cleaning spray or wipes. You'll find a full list of  on the EPA website: www.epa.gov/pesticide-registration/list-n-disinfectants-use-against-sars-cov-2.   Cover your mouth and nose with a mask, tissue or washcloth to avoid spreading germs.  Wash your hands and face often. Use soap and water.  Caregivers in these groups are at risk for severe illness due to COVID-19:  o People 65 years and older  o People who live in a nursing home or long-term care facility  o People with " chronic disease (lung, heart, cancer, diabetes, kidney, liver, immunologic)  o People who have a weakened immune system, including those who:   Are in cancer treatment  Take medicine that weakens the immune system, such as corticosteroids  Had a bone marrow or organ transplant  Have an immune deficiency  Have poorly controlled HIV or AIDS  Are obese (body mass index of 40 or higher)  Smoke regularly   o Caregivers should wear gloves while washing dishes, handling laundry and cleaning bedrooms and bathrooms.  o Use caution when washing and drying laundry: Don't shake dirty laundry, and use the warmest water setting that you can.  o For more tips, go to www.cdc.gov/coronavirus/2019-ncov/downloads/10Things.pdf.       How can I take care of myself?   Get lots of rest. Drink extra fluids (unless a doctor has told you not to).   Take Tylenol (acetaminophen) for fever or pain. If you have liver or kidney problems, ask your family doctor if it's okay to take Tylenol.   Adults can take either:    650 mg (two 325 mg pills) every 4 to 6 hours, or...   1,000 mg (two 500 mg pills) every 8 hours as needed.    Note: Don't take more than 3,000 mg in one day. Acetaminophen is found in many medicines (both prescribed and over-the-counter medicines). Read all labels to be sure you don't take too much.   For children, check the Tylenol bottle for the right dose. The dose is based on the child's age or weight.    If you have other health problems (like cancer, heart failure, an organ transplant or severe kidney disease): Call your specialty clinic if you don't feel better in the next 2 days.       Know when to call 911. Emergency warning signs include:    Trouble breathing or shortness of breath Pain or pressure in the chest that doesn't go away Feeling confused like you haven't felt before, or not being able to wake up Bluish-colored lips or face.  Where can I get more information?   [facility] -- About COVID-19:  [email or web address]   CDC -- What to Do If You're Sick: www.cdc.gov/coronavirus/2019-ncov/about/steps-when-sick.html   CDC -- Ending Home Isolation: www.cdc.gov/coronavirus/2019-ncov/hcp/disposition-in-home-patients.html   CDC -- Caring for Someone: www.cdc.gov/coronavirus/2019-ncov/if-you-are-sick/care-for-someone.html   [facility] -- Interim Guidance for Hospital Discharge to Home: www.[facility].[place]./diseases/coronavirus/hcp/hospdischarge.pdf   [facility] clinical trials (COVID-19 research studies): clinicalaffairs.[facility]-clinical-trials    Below are the COVID-19 hotlines at the [place] Department of [facility] ([facility]). Interpreters are available.    For health questions: Call 092-877-8506 or 1-975.180.6202 (7 a.m. to 7 p.m.) For questions about schools and childcare: Call 207-958-6418 or 1-751.785.1139 (7 a.m. to 7 p.m.)    Diagnosis: Acute upper respiratory infection, unspecified  Diagnosis ICD: J06.9

## 2020-08-29 DIAGNOSIS — Z20.822 SUSPECTED COVID-19 VIRUS INFECTION: ICD-10-CM

## 2020-08-29 PROCEDURE — U0003 INFECTIOUS AGENT DETECTION BY NUCLEIC ACID (DNA OR RNA); SEVERE ACUTE RESPIRATORY SYNDROME CORONAVIRUS 2 (SARS-COV-2) (CORONAVIRUS DISEASE [COVID-19]), AMPLIFIED PROBE TECHNIQUE, MAKING USE OF HIGH THROUGHPUT TECHNOLOGIES AS DESCRIBED BY CMS-2020-01-R: HCPCS | Performed by: FAMILY MEDICINE

## 2020-08-30 LAB
SARS-COV-2 RNA SPEC QL NAA+PROBE: NOT DETECTED
SPECIMEN SOURCE: NORMAL

## 2020-11-04 ENCOUNTER — NURSE TRIAGE (OUTPATIENT)
Dept: INTERNAL MEDICINE | Facility: CLINIC | Age: 35
End: 2020-11-04

## 2020-11-04 ENCOUNTER — APPOINTMENT (OUTPATIENT)
Dept: MRI IMAGING | Facility: CLINIC | Age: 35
End: 2020-11-04
Attending: EMERGENCY MEDICINE
Payer: COMMERCIAL

## 2020-11-04 ENCOUNTER — HOSPITAL ENCOUNTER (EMERGENCY)
Facility: CLINIC | Age: 35
Discharge: HOME | End: 2020-11-04
Attending: EMERGENCY MEDICINE | Admitting: EMERGENCY MEDICINE
Payer: COMMERCIAL

## 2020-11-04 VITALS
HEART RATE: 54 BPM | OXYGEN SATURATION: 97 % | HEIGHT: 64 IN | TEMPERATURE: 97.8 F | RESPIRATION RATE: 16 BRPM | BODY MASS INDEX: 30.73 KG/M2 | WEIGHT: 180 LBS | DIASTOLIC BLOOD PRESSURE: 77 MMHG | SYSTOLIC BLOOD PRESSURE: 103 MMHG

## 2020-11-04 DIAGNOSIS — R51.9 ACUTE INTRACTABLE HEADACHE, UNSPECIFIED HEADACHE TYPE: ICD-10-CM

## 2020-11-04 LAB
ANION GAP SERPL CALCULATED.3IONS-SCNC: 4 MMOL/L (ref 3–14)
BASOPHILS # BLD AUTO: 0 10E9/L (ref 0–0.2)
BASOPHILS NFR BLD AUTO: 0.1 %
BUN SERPL-MCNC: 8 MG/DL (ref 7–30)
CALCIUM SERPL-MCNC: 9 MG/DL (ref 8.5–10.1)
CHLORIDE SERPL-SCNC: 106 MMOL/L (ref 94–109)
CO2 SERPL-SCNC: 25 MMOL/L (ref 20–32)
CREAT SERPL-MCNC: 0.66 MG/DL (ref 0.52–1.04)
CRP SERPL-MCNC: <2.9 MG/L (ref 0–8)
DIFFERENTIAL METHOD BLD: ABNORMAL
EOSINOPHIL # BLD AUTO: 0.1 10E9/L (ref 0–0.7)
EOSINOPHIL NFR BLD AUTO: 1.8 %
ERYTHROCYTE [DISTWIDTH] IN BLOOD BY AUTOMATED COUNT: 12.2 % (ref 10–15)
ERYTHROCYTE [SEDIMENTATION RATE] IN BLOOD BY WESTERGREN METHOD: 8 MM/H (ref 0–20)
GFR SERPL CREATININE-BSD FRML MDRD: >90 ML/MIN/{1.73_M2}
GLUCOSE SERPL-MCNC: 91 MG/DL (ref 70–99)
HCT VFR BLD AUTO: 51 % (ref 35–47)
HGB BLD-MCNC: 16.7 G/DL (ref 11.7–15.7)
IMM GRANULOCYTES # BLD: 0 10E9/L (ref 0–0.4)
IMM GRANULOCYTES NFR BLD: 0.1 %
LYMPHOCYTES # BLD AUTO: 1.3 10E9/L (ref 0.8–5.3)
LYMPHOCYTES NFR BLD AUTO: 18.2 %
MCH RBC QN AUTO: 29 PG (ref 26.5–33)
MCHC RBC AUTO-ENTMCNC: 32.7 G/DL (ref 31.5–36.5)
MCV RBC AUTO: 89 FL (ref 78–100)
MONOCYTES # BLD AUTO: 0.5 10E9/L (ref 0–1.3)
MONOCYTES NFR BLD AUTO: 7.2 %
NEUTROPHILS # BLD AUTO: 5 10E9/L (ref 1.6–8.3)
NEUTROPHILS NFR BLD AUTO: 72.6 %
NRBC # BLD AUTO: 0 10*3/UL
NRBC BLD AUTO-RTO: 0 /100
PLATELET # BLD AUTO: 282 10E9/L (ref 150–450)
POTASSIUM SERPL-SCNC: 4 MMOL/L (ref 3.4–5.3)
RBC # BLD AUTO: 5.75 10E12/L (ref 3.8–5.2)
SODIUM SERPL-SCNC: 135 MMOL/L (ref 133–144)
WBC # BLD AUTO: 6.9 10E9/L (ref 4–11)

## 2020-11-04 PROCEDURE — 99285 EMERGENCY DEPT VISIT HI MDM: CPT | Mod: 25

## 2020-11-04 PROCEDURE — 85025 COMPLETE CBC W/AUTO DIFF WBC: CPT | Performed by: EMERGENCY MEDICINE

## 2020-11-04 PROCEDURE — 70551 MRI BRAIN STEM W/O DYE: CPT

## 2020-11-04 PROCEDURE — 85652 RBC SED RATE AUTOMATED: CPT | Performed by: EMERGENCY MEDICINE

## 2020-11-04 PROCEDURE — 80048 BASIC METABOLIC PNL TOTAL CA: CPT | Performed by: EMERGENCY MEDICINE

## 2020-11-04 PROCEDURE — 86140 C-REACTIVE PROTEIN: CPT | Performed by: EMERGENCY MEDICINE

## 2020-11-04 PROCEDURE — 250N000013 HC RX MED GY IP 250 OP 250 PS 637: Performed by: EMERGENCY MEDICINE

## 2020-11-04 PROCEDURE — 96375 TX/PRO/DX INJ NEW DRUG ADDON: CPT

## 2020-11-04 PROCEDURE — 258N000003 HC RX IP 258 OP 636: Performed by: EMERGENCY MEDICINE

## 2020-11-04 PROCEDURE — 96374 THER/PROPH/DIAG INJ IV PUSH: CPT

## 2020-11-04 PROCEDURE — 96361 HYDRATE IV INFUSION ADD-ON: CPT

## 2020-11-04 PROCEDURE — 250N000011 HC RX IP 250 OP 636: Performed by: EMERGENCY MEDICINE

## 2020-11-04 RX ORDER — PROCHLORPERAZINE MALEATE 5 MG
5 TABLET ORAL EVERY 8 HOURS PRN
Qty: 20 TABLET | Refills: 0 | Status: SHIPPED | OUTPATIENT
Start: 2020-11-04 | End: 2021-03-30

## 2020-11-04 RX ORDER — RIZATRIPTAN BENZOATE 10 MG/1
10 TABLET ORAL
Qty: 10 TABLET | Refills: 0 | Status: SHIPPED | OUTPATIENT
Start: 2020-11-04 | End: 2021-03-30

## 2020-11-04 RX ORDER — RIZATRIPTAN BENZOATE 10 MG/1
10 TABLET ORAL
Status: COMPLETED | OUTPATIENT
Start: 2020-11-04 | End: 2020-11-04

## 2020-11-04 RX ORDER — KETOROLAC TROMETHAMINE 15 MG/ML
15 INJECTION, SOLUTION INTRAMUSCULAR; INTRAVENOUS ONCE
Status: COMPLETED | OUTPATIENT
Start: 2020-11-04 | End: 2020-11-04

## 2020-11-04 RX ORDER — IBUPROFEN 600 MG/1
600 TABLET, FILM COATED ORAL EVERY 8 HOURS PRN
Qty: 30 TABLET | Refills: 0 | Status: SHIPPED | OUTPATIENT
Start: 2020-11-04 | End: 2021-03-30

## 2020-11-04 RX ORDER — RIZATRIPTAN BENZOATE 10 MG/1
10 TABLET ORAL
Status: DISCONTINUED | OUTPATIENT
Start: 2020-11-04 | End: 2020-11-04

## 2020-11-04 RX ADMIN — KETOROLAC TROMETHAMINE 15 MG: 15 INJECTION, SOLUTION INTRAMUSCULAR; INTRAVENOUS at 12:25

## 2020-11-04 RX ADMIN — RIZATRIPTAN BENZOATE 10 MG: 10 TABLET ORAL at 13:59

## 2020-11-04 RX ADMIN — SODIUM CHLORIDE 1000 ML: 9 INJECTION, SOLUTION INTRAVENOUS at 12:25

## 2020-11-04 RX ADMIN — PROCHLORPERAZINE EDISYLATE 5 MG: 5 INJECTION INTRAMUSCULAR; INTRAVENOUS at 12:25

## 2020-11-04 ASSESSMENT — MIFFLIN-ST. JEOR: SCORE: 1496.47

## 2020-11-04 NOTE — ED AVS SNAPSHOT
[facility] Emergency Dept  [address] [address]  Phone: [phone]  Fax: [phone]                                    Berna Cheung   MRN: [id number]    Department: [facility] Emergency Dept   Date of Visit: 11/4/2020           After Visit Summary Signature Page    I have received my discharge instructions, and my questions have been answered. I have discussed any challenges I see with this plan with the nurse or doctor.    ..........................................................................................................................................  Patient/Patient Representative Signature      ..........................................................................................................................................  Patient Representative Print Name and Relationship to Patient    ..................................................               ................................................  Date                                   Time    ..........................................................................................................................................  Reviewed by Signature/Title    ...................................................              ..............................................  Date                                               Time          22EPIC Rev 08/18

## 2020-11-04 NOTE — DISCHARGE INSTRUCTIONS
Discharge Instructions  Headache    You were seen today for a headache. Headaches may be caused by many different things such as muscle tension, sinus inflammation, anxiety and stress, having too little sleep, too much alcohol, some medical conditions or injury. You may have a migraine, which is caused by changes in the blood vessels in your head.  At this time your provider does not find that your headache is a sign of anything dangerous or life-threatening.  However, sometimes the signs of serious illness do not show up right away.      Generally, every Emergency Department visit should have a follow-up clinic visit with either a primary or a specialty clinic/provider. Please follow-up as instructed by your emergency provider today.    Return to the Emergency Department if:  You get a new fever of 100.4 F or higher.  Your headache gets much worse.  You get a stiff neck with your headache.  You get a new headache that is significantly different or worse than headaches you have had before.  You are vomiting (throwing up) and cannot keep food or water down.  You have blurry or double vision or other problems with your eyes.  You have a new weakness on one side of your body.  You have difficulty with balance which is new.  You or your family thinks you are confused.  You have a seizure.    What can I do to help myself?  Pain medications - You may take a pain medication such as Tylenol  (acetaminophen), Advil , Motrin  (ibuprofen) or Aleve  (naproxen).  Take a pain reliever as soon as you notice symptoms.  Starting medications as soon as you start to have symptoms may lessen the amount of pain you have.  Relaxing in a quiet, dark room may help.  Get enough sleep and eat meals regularly.  You may need to watch for certain foods or other things which may trigger your headaches.  Keeping a journal of your headaches and possible triggers may help you and your primary provider to identify things which you should avoid which  may be causing your headaches.  If you were given a prescription for medicine here today, be sure to read all of the information (including the package insert) that comes with your prescription.  This will include important information about the medicine, its side effects, and any warnings that you need to know about.  The pharmacist who fills the prescription can provide more information and answer questions you may have about the medicine.  If you have questions or concerns that the pharmacist cannot address, please call or return to the Emergency Department.   Remember that you can always come back to the Emergency Department if you are not able to see your regular provider in the amount of time listed above, if you get any new symptoms, or if there is anything that worries you.

## 2020-11-04 NOTE — LETTER
November 4, 2020      To Whom It May Concern:      Berna Cheung was seen in our Emergency Department today, 11/04/2020.  I expect her condition to improve over the next 1 day.  She may return to work when improved.    Sincerely,        OBED SETH RN

## 2020-11-04 NOTE — ED PROVIDER NOTES
Patient signed out to me pending headache    Needs MRI brain followed up. If negative, home    MR Brain w/o and w Contrast  IMPRESSION: Normal brain MRI.  As read by Radiology.    Discussed results with patient       Beth Varghese MD  11/04/20 2257

## 2020-11-04 NOTE — ED PROVIDER NOTES
History     Chief Complaint:  Headache    HPI   Berna Cheung is a 35 year old female who presents with a 5-day history of recurring headaches.  She first noted on Saturday, 5 days ago, a left sided pounding headache, with a subtle visual abnormality which she believed was coming from her left eye.  She was having trouble looking at her phone.  In retrospect she does think when I described this that it may be have been a scotoma.  She has reported no zigzag lines or scintillating visual aura.  She has no definitive diagnosis of migraine.  The patient noted on Sunday, 4 days ago similar symptoms.  She was able to sleep that night.  2 days ago and yesterday the patient has had similar symptoms with a left-sided pounding headache worse with activity, and some photosensitivity.  She also notes at times when she looks through her left eye that the text on her phone appears mildly unusual.  The patient had recurrent symptoms today, and given this is the fifth day of symptoms she came to the emergency department.  There is no scalp or hair sensitivity or dysesthesia.  No neck pain.  She has no aphasia or motor weakness or sensory complaints.    Allergies:  Oxycodone     Medications:    The patient is currently on no regular medications.    Past Medical History:    RAMYA  Gestational diabetes  Acute bronchitis  Maxillary sinitis    Past Surgical History:    The patient does not have any pertinent past surgical history.    Family History:    Alzheimer's  DM  Hypertension    Social History:  Tobacco use: Current every day smoker  Alcohol use: Yes  Drug use: No  Marital Status:  Single [1]    Review of Systems  No fever.  No chills or sweats.  No chest pain or shortness of breath.  No extremity weakness.  No aphasia.  Her main symptom is left-sided pounding headache, intermittent left eye monocular visual disturbance, mild nausea but no vomiting.  All other systems are negative.    Physical Exam   First Vitals:  BP: (!)  "155/96  Pulse: 82  Temp: 97.8  F (36.6  C)  Resp: 16  Height: 162.6 cm (5' 4\")  Weight: 81.6 kg (180 lb)  SpO2: 99 %      Physical Exam  General: Resting uncomfortably on the gurney  Head:  The scalp, face, and head appear normal  Eyes:  The pupils are equal, round, and reactive to light    There is no nystagmus    Extraocular muscles are intact    Conjunctivae and sclerae are normal  ENT:    The nose is normal    Pinnae are normal    The oropharynx is normal    Uvula is in the midline  Neck:  Normal range of motion    There is no nuchal rigidity noted    No meningismus detected    There is no midline cervical spine pain/tenderness  CV:  Regular rate and underlying rhythm     Normal S1 and S2    No S3 or S4    No pathological murmur detected  Resp:  Lungs are clear    There is no tachypnea    Non-labored breathing    No rales    No wheezing   GI:  Abdomen is soft, there is no rigidity    No distension    No tympani    No rebound tenderness     Non-surgical, without peritoneal features  MS:  No major joint effusions    No asymmetric leg swelling    No calf tenderness  Skin:  No rash or acute skin lesions noted  Neuro: Speech is normal and fluent    No aphasia    Cranial nerves are intact    Motor exam is normal to the extremities    Reflexes are symmetric  Psych:  Awake. Alert.  Normal affect.  Appropriate interactions.  Lymph: No anterior or posterior cervical lymphadenopathy noted          Emergency Department Course     MR Brain w/o contrast:  MRI is pending at this time    Laboratory:  Labs Ordered and Resulted from Time of ED Arrival Up to the Time of Departure from the ED   CBC WITH PLATELETS DIFFERENTIAL - Abnormal; Notable for the following components:       Result Value    RBC Count 5.75 (*)     Hemoglobin 16.7 (*)     Hematocrit 51.0 (*)     All other components within normal limits   BASIC METABOLIC PANEL   CRP INFLAMMATION   ERYTHROCYTE SEDIMENTATION RATE AUTO   IV ACCESS   CRP is less than 2.9  ESR is " 8    Procedures:        Interventions:  Medications   0.9% sodium chloride BOLUS (0 mLs Intravenous Stopped 11/4/20 1346)   ketorolac (TORADOL) injection 15 mg (15 mg Intravenous Given 11/4/20 1225)   prochlorperazine (COMPAZINE) injection 5 mg (5 mg Intravenous Given 11/4/20 1225)   rizatriptan (MAXALT) tablet 10 mg (10 mg Oral Given 11/4/20 1359)       Emergency Department Course:  The patient had intravenous Toradol, intravenous Compazine and oral Maxalt given.       Impression & Plan         Medical Decision Making:  This patient presents with a 5-day history of headache as noted above.  It is largely left-sided with some ocular symptoms such as scotoma.  She does not have a baseline history of migraine that she is aware of.  There are many classic features of migraine describes such as photosensitivity, unilateral pounding pain, scotoma, but the daily recurrence is a bit unusual.  There is no eye tearing or facial pain.  The patient is undergoing MRI of the brain to make sure that there is no posterior circulatory stroke, hydrocephalus, or space-occupying lesion, this result will be reviewed by the oncoming physician at 1400 hrs.  If the MRI is negative the patient will go home with ibuprofen, Compazine, and rizatriptan.    Diagnosis:    ICD-10-CM    1. Acute intractable headache, unspecified headache type  R51.9      Patient is signed out to the oncoming physician at 1600 hrs..    Discharge Medications:  Ibuprofen, Compazine, rizatriptan    Ovidio Martin MD  11/4/2020   [facility] EMERGENCY DEPT       Ovidio Martin MD  11/04/20 9949

## 2020-11-04 NOTE — TELEPHONE ENCOUNTER
"Pt states HA on L side.  No hx of migraines.  Today, pt notes some blurry vision when staring at her computer.   No increased stress, no med changes.  Smokes tobacco, no oral birth control.     Dispo to ED due to severe HA, eye pain, blurred vision.  Pt will have someone drive her to [facility].      Additional Information    Negative: Difficult to awaken or acting confused (e.g., disoriented, slurred speech)    Negative: Weakness of the face, arm or leg on one side of the body and new onset    Negative: Numbness of the face, arm or leg on one side of the body and new onset    Negative: Loss of speech or garbled speech and new onset    Negative: Passed out (i.e., fainted, collapsed and was not responding)    Negative: Sounds like a life-threatening emergency to the triager    Negative: Followed a head injury within last 3 days    Negative: Traumatic Brain Injury (TBI) is suspected    Negative: Sinus pain of forehead and yellow or green nasal discharge    Negative: Pregnant    Negative: Unable to walk without falling    Negative: Stiff neck (can't touch chin to chest)    Negative: Possibility of carbon monoxide exposure    Negative: SEVERE headache, states 'worst headache' of life    Negative: SEVERE headache, sudden onset (i.e., reaching maximum intensity within 30 seconds)    Negative: Severe pain in one eye    Loss of vision or double vision    Answer Assessment - Initial Assessment Questions  1. LOCATION: \"Where does it hurt?\"       L side of head    2. ONSET: \"When did the headache start?\" (Minutes, hours or days)       Saturday    3. PATTERN: \"Does the pain come and go, or has it been constant since it started?\"      Constant     4. SEVERITY: \"How bad is the pain?\" and \"What does it keep you from doing?\"  (e.g., Scale 1-10; mild, moderate, or severe)    - MILD (1-3): doesn't interfere with normal activities     - MODERATE (4-7): interferes with normal activities or awakens from sleep     - SEVERE (8-10): " "excruciating pain, unable to do any normal activities         Severe, 10    5. RECURRENT SYMPTOM: \"Have you ever had headaches before?\" If so, ask: \"When was the last time?\" and \"What happened that time?\"       No    6. CAUSE: \"What do you think is causing the headache?\"      Unsure    7. MIGRAINE: \"Have you been diagnosed with migraine headaches?\" If so, ask: \"Is this headache similar?\"       No    8. HEAD INJURY: \"Has there been any recent injury to the head?\"       No    9. OTHER SYMPTOMS: \"Do you have any other symptoms?\" (fever, stiff neck, eye pain, sore throat, cold symptoms)      Eye pain, chills,     10. PREGNANCY: \"Is there any chance you are pregnant?\" \"When was your last menstrual period?\"        No    Protocols used: HEADACHE-A-[place]      "

## 2020-11-29 ENCOUNTER — HEALTH MAINTENANCE LETTER (OUTPATIENT)
Age: 35
End: 2020-11-29

## 2021-03-30 ENCOUNTER — HOSPITAL ENCOUNTER (EMERGENCY)
Facility: CLINIC | Age: 36
Discharge: HOME | End: 2021-03-30
Attending: NURSE PRACTITIONER | Admitting: NURSE PRACTITIONER
Payer: COMMERCIAL

## 2021-03-30 VITALS
WEIGHT: 185 LBS | HEART RATE: 58 BPM | HEIGHT: 64 IN | BODY MASS INDEX: 31.58 KG/M2 | SYSTOLIC BLOOD PRESSURE: 126 MMHG | RESPIRATION RATE: 16 BRPM | OXYGEN SATURATION: 100 % | DIASTOLIC BLOOD PRESSURE: 82 MMHG | TEMPERATURE: 97.9 F

## 2021-03-30 DIAGNOSIS — T78.40XA ALLERGIC REACTION, INITIAL ENCOUNTER: ICD-10-CM

## 2021-03-30 DIAGNOSIS — L50.9 HIVES: ICD-10-CM

## 2021-03-30 PROCEDURE — 96375 TX/PRO/DX INJ NEW DRUG ADDON: CPT

## 2021-03-30 PROCEDURE — 250N000011 HC RX IP 250 OP 636: Performed by: NURSE PRACTITIONER

## 2021-03-30 PROCEDURE — 96361 HYDRATE IV INFUSION ADD-ON: CPT

## 2021-03-30 PROCEDURE — 96374 THER/PROPH/DIAG INJ IV PUSH: CPT

## 2021-03-30 PROCEDURE — 258N000003 HC RX IP 258 OP 636: Performed by: NURSE PRACTITIONER

## 2021-03-30 PROCEDURE — 99284 EMERGENCY DEPT VISIT MOD MDM: CPT | Mod: 25

## 2021-03-30 RX ORDER — METHYLPREDNISOLONE SODIUM SUCCINATE 125 MG/2ML
125 INJECTION, POWDER, LYOPHILIZED, FOR SOLUTION INTRAMUSCULAR; INTRAVENOUS ONCE
Status: COMPLETED | OUTPATIENT
Start: 2021-03-30 | End: 2021-03-30

## 2021-03-30 RX ORDER — DIPHENHYDRAMINE HYDROCHLORIDE 50 MG/ML
50 INJECTION INTRAMUSCULAR; INTRAVENOUS ONCE
Status: COMPLETED | OUTPATIENT
Start: 2021-03-30 | End: 2021-03-30

## 2021-03-30 RX ADMIN — DIPHENHYDRAMINE HYDROCHLORIDE 50 MG: 50 INJECTION, SOLUTION INTRAMUSCULAR; INTRAVENOUS at 11:59

## 2021-03-30 RX ADMIN — METHYLPREDNISOLONE SODIUM SUCCINATE 125 MG: 125 INJECTION, POWDER, FOR SOLUTION INTRAMUSCULAR; INTRAVENOUS at 11:58

## 2021-03-30 RX ADMIN — SODIUM CHLORIDE 1000 ML: 9 INJECTION, SOLUTION INTRAVENOUS at 11:56

## 2021-03-30 ASSESSMENT — ENCOUNTER SYMPTOMS
SHORTNESS OF BREATH: 0
VOMITING: 0
TROUBLE SWALLOWING: 0
FACIAL SWELLING: 1
STRIDOR: 0
WHEEZING: 0
FEVER: 0
ABDOMINAL PAIN: 0
VOICE CHANGE: 0
NAUSEA: 0
CHILLS: 0

## 2021-03-30 ASSESSMENT — MIFFLIN-ST. JEOR: SCORE: 1514.15

## 2021-03-30 NOTE — ED PROVIDER NOTES
"History     Chief Complaint:  Allergic Reaction       The history is provided by the patient.     Berna Cheung is a 36 year old female with a history of anxiety who presents for evaluation of allergic reaction. The patient was driving her kids to work this morning when she noticed her ears felt cold. She then looked in the mirror and noticed swelling of her bilateral ears. She called the nurse line who encouraged she present. She has not taken any medications prior to arrival. Here, she denies any throat closing or tightening. She has not used any new soaps, lotions, or laundry detergents.       Review of Systems   Constitutional: Negative for chills and fever.   HENT: Positive for facial swelling (ears). Negative for trouble swallowing and voice change.         Negative for throat swelling   Respiratory: Negative for shortness of breath, wheezing and stridor.    Gastrointestinal: Negative for abdominal pain, nausea and vomiting.   Skin: Positive for rash.   All other systems reviewed and are negative.    Allergies:  Oxycodone      Medications:   Compazine   Rizatriptan     Medical History:   Generalized anxiety disorder  Gestational diabetes     Family History:   Mother -  Alzheimer's disease, hypertension, gout   Father -  Diabetes     Social History:  The patient was unaccompanied to the ED.  The patient has children.   PCP: Shakila Sandoval        Physical Exam     Patient Vitals for the past 24 hrs:   BP Temp Temp src Pulse Resp SpO2 Height Weight   03/30/21 1300 129/89 -- -- 64 16 99 % -- --   03/30/21 1230 115/74 -- -- 72 16 100 % -- --   03/30/21 1200 (!) 136/90 -- -- 64 16 100 % -- --   03/30/21 1147 122/88 97.4  F (36.3  C) Temporal 65 16 98 % -- --   03/30/21 1146 -- -- -- -- -- -- 1.626 m (5' 4\") 83.9 kg (185 lb)   03/30/21 1145 122/88 -- -- 68 -- 98 % -- --          Physical Exam  General:  Alert, No obvious discomfort, well kept   Eyes: PERRL, conjunctivae pink no scleral icterus or conjunctival " injection  ENT:  Moist mucus membranes, posterior oropharynx clear without erythema or exudates.  Uvula midline without edema, no tongue/lip/oropharngeal swelling.  Mallampati II-III.  No stridor.  Respiratory:  Lungs clear to auscultation bilaterally, no crackles/rubs/wheezes.  Good air movement. No wheezing  CV: Normal rate and rhythm, no murmurs/rubs/gallops  GI:  Abdomen soft and non-distended.  Normoactive BS.  No tenderness, guarding or rebound  Skin: Warm, dry.  No rashes or petechiae.  Bilateral ears with mild swelling and erythema.  Upper torso with erythema and bilateral upper extremities with scattered patches of erythema.  Musculoskeletal: No peripheral edema or calf tenderness  Neuro: Alert and oriented to person/place/time  Psychiatric:  Affect normal. Normal personal interaction. Good eye contact    Emergency Department Course     Emergency Department Course:    Reviewed:  1117 I reviewed the patient's nursing notes, vitals, past medical records, Care Everywhere.     Assessments:  1118 I performed an exam of the patient, as documented above.   1320 Patient rechecked and updated following the below interventions. She is feeling improved and her ears are no longer red. She is in agreement with the plan for discharge at this time.     Interventions:  1156 Normal Saline 1000 mL IV   1158 Solu-Medrol 125 mg IV  1159 Benadryl 50 mg IV    Disposition:  The patient was discharged to home.     Impression & Plan     Medical Decision Making:  Berna Cheung is a 36 year old female who presents for evaluation of an allergic reaction.  Signs and symptoms are consistent with mild generalized allergic reaction. she looks well at discharge and symptoms have stabilized and improved.  She did not have any airway compromise and the epinephrine was not indicated she was treated here with medications as noted above.  She is advised on appropriate use of over-the-counter antihistamines  Return if develops anaphylactic  symptoms.  Signs and symptoms were discussed with patient and they will call 911 should these symptoms occur.  Given the rapidity of resolution, lack of serious systemic symptoms, lack of respiratory difficulty and no oral or pharyngeal swelling, would not admit at this time. There is no signs of anaphylaxis.  She appears to be safe and appropriate for outpatient management follow-up and discharged home.         Diagnosis:     ICD-10-CM    1. Allergic reaction, initial encounter  T78.40XA    2. Hives  L50.9         Scribe Disclosure:  I, Pari García, am serving as a scribe at 11:17 AM on 3/30/2021 to document services personally performed by Rafael Salinas APRN based on my observations and the provider's statements to me.      Rafael Salinas APRN CNP  03/30/21 2712

## 2021-03-30 NOTE — LETTER
March 30, 2021      To Whom It May Concern:      Beran Cheung was seen in our Emergency Department today, 03/30/21.  I expect her condition to improve over the next 2 days.  She may return to work/school when improved.    Sincerely,        Zoë Muñoz RN

## 2021-09-25 ENCOUNTER — HEALTH MAINTENANCE LETTER (OUTPATIENT)
Age: 36
End: 2021-09-25

## 2022-01-15 ENCOUNTER — HEALTH MAINTENANCE LETTER (OUTPATIENT)
Age: 37
End: 2022-01-15

## 2022-02-10 ENCOUNTER — HOSPITAL ENCOUNTER (EMERGENCY)
Facility: CLINIC | Age: 37
Discharge: LEFT BEFORE DISCHARGE | End: 2022-02-10
Admitting: EMERGENCY MEDICINE
Payer: COMMERCIAL

## 2022-02-10 VITALS
HEART RATE: 72 BPM | RESPIRATION RATE: 13 BRPM | TEMPERATURE: 98.1 F | WEIGHT: 198 LBS | BODY MASS INDEX: 33.8 KG/M2 | OXYGEN SATURATION: 98 % | HEIGHT: 64 IN | DIASTOLIC BLOOD PRESSURE: 90 MMHG | SYSTOLIC BLOOD PRESSURE: 142 MMHG

## 2022-02-10 LAB
ATRIAL RATE - MUSE: 68 BPM
DIASTOLIC BLOOD PRESSURE - MUSE: NORMAL MMHG
INTERPRETATION ECG - MUSE: NORMAL
P AXIS - MUSE: 35 DEGREES
PR INTERVAL - MUSE: 150 MS
QRS DURATION - MUSE: 74 MS
QT - MUSE: 388 MS
QTC - MUSE: 412 MS
R AXIS - MUSE: 46 DEGREES
SYSTOLIC BLOOD PRESSURE - MUSE: NORMAL MMHG
T AXIS - MUSE: 40 DEGREES
VENTRICULAR RATE- MUSE: 68 BPM

## 2022-02-10 PROCEDURE — 250N000013 HC RX MED GY IP 250 OP 250 PS 637: Performed by: EMERGENCY MEDICINE

## 2022-02-10 PROCEDURE — 999N000104 HC STATISTIC NO CHARGE

## 2022-02-10 RX ORDER — ACETAMINOPHEN 500 MG
1000 TABLET ORAL ONCE
Status: COMPLETED | OUTPATIENT
Start: 2022-02-10 | End: 2022-02-10

## 2022-02-10 RX ADMIN — ACETAMINOPHEN 1000 MG: 500 TABLET ORAL at 20:18

## 2022-02-10 ASSESSMENT — MIFFLIN-ST. JEOR: SCORE: 1568.12

## 2022-02-11 NOTE — ED TRIAGE NOTES
Patient reports she has been feeling panick attacks all day with a mild headache, felt her heart racing and check her BP at home with the highest of  205/130. Denies any chest pain. Hx. Of anxiety

## 2022-12-26 ENCOUNTER — HEALTH MAINTENANCE LETTER (OUTPATIENT)
Age: 37
End: 2022-12-26

## 2023-03-06 ENCOUNTER — LAB REQUISITION (OUTPATIENT)
Dept: LAB | Facility: CLINIC | Age: 38
End: 2023-03-06
Payer: COMMERCIAL

## 2023-03-06 DIAGNOSIS — Z11.3 ENCOUNTER FOR SCREENING FOR INFECTIONS WITH A PREDOMINANTLY SEXUAL MODE OF TRANSMISSION: ICD-10-CM

## 2023-03-06 LAB — T PALLIDUM AB SER QL: NONREACTIVE

## 2023-03-06 PROCEDURE — 86780 TREPONEMA PALLIDUM: CPT | Mod: ORL | Performed by: NURSE PRACTITIONER

## 2023-03-06 PROCEDURE — 87389 HIV-1 AG W/HIV-1&-2 AB AG IA: CPT | Mod: ORL | Performed by: NURSE PRACTITIONER

## 2023-03-06 PROCEDURE — 87491 CHLMYD TRACH DNA AMP PROBE: CPT | Mod: ORL | Performed by: NURSE PRACTITIONER

## 2023-03-07 LAB
C TRACH DNA SPEC QL PROBE+SIG AMP: NEGATIVE
HIV 1+2 AB+HIV1 P24 AG SERPL QL IA: NONREACTIVE
N GONORRHOEA DNA SPEC QL NAA+PROBE: NEGATIVE

## 2023-04-16 ENCOUNTER — HEALTH MAINTENANCE LETTER (OUTPATIENT)
Age: 38
End: 2023-04-16

## 2024-09-04 ENCOUNTER — OFFICE VISIT (OUTPATIENT)
Dept: PEDIATRICS | Facility: CLINIC | Age: 39
End: 2024-09-04
Payer: COMMERCIAL

## 2024-09-04 VITALS
OXYGEN SATURATION: 96 % | SYSTOLIC BLOOD PRESSURE: 126 MMHG | TEMPERATURE: 97.6 F | HEIGHT: 64 IN | WEIGHT: 215.9 LBS | HEART RATE: 84 BPM | RESPIRATION RATE: 18 BRPM | DIASTOLIC BLOOD PRESSURE: 85 MMHG | BODY MASS INDEX: 36.86 KG/M2

## 2024-09-04 DIAGNOSIS — R05.1 ACUTE COUGH: Primary | ICD-10-CM

## 2024-09-04 PROCEDURE — 87635 SARS-COV-2 COVID-19 AMP PRB: CPT | Performed by: INTERNAL MEDICINE

## 2024-09-04 PROCEDURE — G2211 COMPLEX E/M VISIT ADD ON: HCPCS | Performed by: INTERNAL MEDICINE

## 2024-09-04 PROCEDURE — 99203 OFFICE O/P NEW LOW 30 MIN: CPT | Performed by: INTERNAL MEDICINE

## 2024-09-04 RX ORDER — METHYLPREDNISOLONE 4 MG
TABLET, DOSE PACK ORAL
Qty: 21 TABLET | Refills: 0 | Status: SHIPPED | OUTPATIENT
Start: 2024-09-04

## 2024-09-04 RX ORDER — ETONOGESTREL AND ETHINYL ESTRADIOL VAGINAL RING .015; .12 MG/D; MG/D
RING VAGINAL
COMMUNITY
Start: 2023-03-06

## 2024-09-04 RX ORDER — ALBUTEROL SULFATE 90 UG/1
2 AEROSOL, METERED RESPIRATORY (INHALATION) EVERY 6 HOURS PRN
Qty: 18 G | Refills: 0 | Status: SHIPPED | OUTPATIENT
Start: 2024-09-04

## 2024-09-04 RX ORDER — KETOROLAC TROMETHAMINE 10 MG/1
1 TABLET, FILM COATED ORAL EVERY 6 HOURS PRN
COMMUNITY

## 2024-09-04 RX ORDER — BUPROPION HYDROCHLORIDE 150 MG/1
1 TABLET, EXTENDED RELEASE ORAL DAILY
COMMUNITY

## 2024-09-04 RX ORDER — HYDROXYZINE PAMOATE 25 MG/1
CAPSULE ORAL
COMMUNITY

## 2024-09-04 NOTE — PROGRESS NOTES
"  Assessment & Plan     (R05.1) Acute cough  (primary encounter diagnosis)  Comment: Likely viral URI syndrome. Concern for covid, will test today. Due to wheezes on exam, will trial albuterol inhaler and have her complete a medrol dose juvencio.   Plan: albuterol (PROAIR HFA/PROVENTIL HFA/VENTOLIN         HFA) 108 (90 Base) MCG/ACT inhaler,         methylPREDNISolone (MEDROL DOSEPAK) 4 MG tablet        therapy pack, Symptomatic COVID-19 Virus         (Coronavirus) by PCR            The longitudinal plan of care for the diagnosis(es)/condition(s) as documented were addressed during this visit. Due to the added complexity in care, I will continue to support Berna in the subsequent management and with ongoing continuity of care.       Nicotine/Tobacco Cessation  She reports that she has been smoking. She has been exposed to tobacco smoke. She has never used smokeless tobacco.  Nicotine/Tobacco Cessation Plan  Information offered: Patient not interested at this time      BMI  Estimated body mass index is 37.32 kg/m  as calculated from the following:    Height as of this encounter: 1.62 m (5' 3.78\").    Weight as of this encounter: 97.9 kg (215 lb 14.4 oz).             Subjective   Berna is a 39 year old, presenting for the following health issues:  Cough      9/4/2024     4:14 PM   Additional Questions   Roomed by FL   Accompanied by Self         9/4/2024     4:14 PM   Patient Reported Additional Medications   Patient reports taking the following new medications None     HPI       Acute Illness  Acute illness concerns: Cough and Fever  Onset/Duration: 8/28/24  Symptoms:  Fever: YES  Chills/Sweats: YES  Headache (location?): YES  Sinus Pressure: No  Conjunctivitis:  No  Ear Pain: no  Rhinorrhea: No  Congestion: YES- some using peopermint  Sore Throat: YES- when coughing   Cough: YES-productive of clear sputum, with shortness of breath, barking, worsening over time  Wheeze: YES  Decreased Appetite: YES- eating very little " "  Nausea: No  Vomiting: YES Saturday 8/31  Diarrhea: YES today   Dysuria/Freq.: YES  Dysuria or Hematuria: YES  Fatigue/Achiness: YES  Sick/Strep Exposure: YES- son has  cough   Therapies tried and outcome: Advil tylenol, elder berry cough syrup, benadryl, Allergy meds     For the past week: cough, sore throat, achiness, tired.      Not significant shortness of breath except when coughing. Has long coughing jags. No fever.     Has been taking tylenol, advil.               Objective    /85 (BP Location: Right arm, Patient Position: Sitting, Cuff Size: Adult Large)   Pulse 84   Temp 97.6  F (36.4  C) (Temporal)   Resp 18   Ht 1.62 m (5' 3.78\")   Wt 97.9 kg (215 lb 14.4 oz)   LMP 08/13/2024 (Exact Date)   SpO2 96%   BMI 37.32 kg/m    Body mass index is 37.32 kg/m .  Physical Exam   GENERAL: alert and no distress  EYES: Eyes grossly normal to inspection  HENT: ear canals and TM's normal, nose with clear rhinorrhea and mouth without ulcers or lesions  NECK: no adenopathy, no asymmetry, masses, or scars  RESP: expiratory wheezes and positive for intermittent cough  CV: regular rate and rhythm, normal S1 S2, no S3 or S4, no murmur, click or rub, no peripheral edema  MS: no gross musculoskeletal defects noted, no edema  SKIN: no suspicious lesions or rashes  NEURO: Normal strength and tone, mentation intact and speech normal  PSYCH: mentation appears normal, affect normal/bright            Signed Electronically by: Bethany Alfonso MD    "

## 2024-09-05 LAB — SARS-COV-2 RNA RESP QL NAA+PROBE: NEGATIVE

## 2024-11-10 ENCOUNTER — HEALTH MAINTENANCE LETTER (OUTPATIENT)
Age: 39
End: 2024-11-10

## 2025-02-02 ENCOUNTER — HEALTH MAINTENANCE LETTER (OUTPATIENT)
Age: 40
End: 2025-02-02